# Patient Record
Sex: FEMALE | Race: WHITE | NOT HISPANIC OR LATINO | URBAN - METROPOLITAN AREA
[De-identification: names, ages, dates, MRNs, and addresses within clinical notes are randomized per-mention and may not be internally consistent; named-entity substitution may affect disease eponyms.]

---

## 2017-01-24 PROBLEM — Z00.00 ENCOUNTER FOR PREVENTIVE HEALTH EXAMINATION: Status: ACTIVE | Noted: 2017-01-24

## 2022-12-28 ENCOUNTER — INPATIENT (INPATIENT)
Facility: HOSPITAL | Age: 77
LOS: 1 days | Discharge: ANOTHER HOSPITAL | DRG: 305 | End: 2022-12-30
Attending: INTERNAL MEDICINE | Admitting: INTERNAL MEDICINE
Payer: COMMERCIAL

## 2022-12-28 VITALS
HEIGHT: 63 IN | RESPIRATION RATE: 18 BRPM | SYSTOLIC BLOOD PRESSURE: 209 MMHG | OXYGEN SATURATION: 97 % | HEART RATE: 65 BPM | WEIGHT: 199.96 LBS | TEMPERATURE: 98 F | DIASTOLIC BLOOD PRESSURE: 96 MMHG

## 2022-12-28 DIAGNOSIS — R29.898 OTHER SYMPTOMS AND SIGNS INVOLVING THE MUSCULOSKELETAL SYSTEM: ICD-10-CM

## 2022-12-28 LAB
ALBUMIN SERPL ELPH-MCNC: 3.2 G/DL — LOW (ref 3.5–5)
ALP SERPL-CCNC: 96 U/L — SIGNIFICANT CHANGE UP (ref 40–120)
ALT FLD-CCNC: 18 U/L DA — SIGNIFICANT CHANGE UP (ref 10–60)
ANION GAP SERPL CALC-SCNC: 7 MMOL/L — SIGNIFICANT CHANGE UP (ref 5–17)
AST SERPL-CCNC: 15 U/L — SIGNIFICANT CHANGE UP (ref 10–40)
BASOPHILS # BLD AUTO: 0.05 K/UL — SIGNIFICANT CHANGE UP (ref 0–0.2)
BASOPHILS NFR BLD AUTO: 0.5 % — SIGNIFICANT CHANGE UP (ref 0–2)
BILIRUB SERPL-MCNC: 0.2 MG/DL — SIGNIFICANT CHANGE UP (ref 0.2–1.2)
BUN SERPL-MCNC: 28 MG/DL — HIGH (ref 7–18)
CALCIUM SERPL-MCNC: 9.5 MG/DL — SIGNIFICANT CHANGE UP (ref 8.4–10.5)
CHLORIDE SERPL-SCNC: 103 MMOL/L — SIGNIFICANT CHANGE UP (ref 96–108)
CO2 SERPL-SCNC: 27 MMOL/L — SIGNIFICANT CHANGE UP (ref 22–31)
CREAT SERPL-MCNC: 1 MG/DL — SIGNIFICANT CHANGE UP (ref 0.5–1.3)
EGFR: 57 ML/MIN/1.73M2 — LOW
EOSINOPHIL # BLD AUTO: 0.58 K/UL — HIGH (ref 0–0.5)
EOSINOPHIL NFR BLD AUTO: 6.3 % — HIGH (ref 0–6)
GLUCOSE SERPL-MCNC: 158 MG/DL — HIGH (ref 70–99)
HCT VFR BLD CALC: 32.3 % — LOW (ref 34.5–45)
HGB BLD-MCNC: 10.3 G/DL — LOW (ref 11.5–15.5)
IMM GRANULOCYTES NFR BLD AUTO: 0.3 % — SIGNIFICANT CHANGE UP (ref 0–0.9)
LYMPHOCYTES # BLD AUTO: 1.95 K/UL — SIGNIFICANT CHANGE UP (ref 1–3.3)
LYMPHOCYTES # BLD AUTO: 21 % — SIGNIFICANT CHANGE UP (ref 13–44)
MAGNESIUM SERPL-MCNC: 2 MG/DL — SIGNIFICANT CHANGE UP (ref 1.6–2.6)
MCHC RBC-ENTMCNC: 24.5 PG — LOW (ref 27–34)
MCHC RBC-ENTMCNC: 31.9 GM/DL — LOW (ref 32–36)
MCV RBC AUTO: 76.9 FL — LOW (ref 80–100)
MONOCYTES # BLD AUTO: 0.6 K/UL — SIGNIFICANT CHANGE UP (ref 0–0.9)
MONOCYTES NFR BLD AUTO: 6.5 % — SIGNIFICANT CHANGE UP (ref 2–14)
NEUTROPHILS # BLD AUTO: 6.07 K/UL — SIGNIFICANT CHANGE UP (ref 1.8–7.4)
NEUTROPHILS NFR BLD AUTO: 65.4 % — SIGNIFICANT CHANGE UP (ref 43–77)
NRBC # BLD: 0 /100 WBCS — SIGNIFICANT CHANGE UP (ref 0–0)
PLATELET # BLD AUTO: 302 K/UL — SIGNIFICANT CHANGE UP (ref 150–400)
POTASSIUM SERPL-MCNC: 4.5 MMOL/L — SIGNIFICANT CHANGE UP (ref 3.5–5.3)
POTASSIUM SERPL-SCNC: 4.5 MMOL/L — SIGNIFICANT CHANGE UP (ref 3.5–5.3)
PROT SERPL-MCNC: 7.4 G/DL — SIGNIFICANT CHANGE UP (ref 6–8.3)
RBC # BLD: 4.2 M/UL — SIGNIFICANT CHANGE UP (ref 3.8–5.2)
RBC # FLD: 14.8 % — HIGH (ref 10.3–14.5)
SARS-COV-2 RNA SPEC QL NAA+PROBE: SIGNIFICANT CHANGE UP
SODIUM SERPL-SCNC: 137 MMOL/L — SIGNIFICANT CHANGE UP (ref 135–145)
TROPONIN I, HIGH SENSITIVITY RESULT: 7.6 NG/L — SIGNIFICANT CHANGE UP
WBC # BLD: 9.28 K/UL — SIGNIFICANT CHANGE UP (ref 3.8–10.5)
WBC # FLD AUTO: 9.28 K/UL — SIGNIFICANT CHANGE UP (ref 3.8–10.5)

## 2022-12-28 PROCEDURE — 71045 X-RAY EXAM CHEST 1 VIEW: CPT | Mod: 26

## 2022-12-28 PROCEDURE — 99223 1ST HOSP IP/OBS HIGH 75: CPT | Mod: GC

## 2022-12-28 PROCEDURE — 72192 CT PELVIS W/O DYE: CPT | Mod: 26,MA

## 2022-12-28 PROCEDURE — 70450 CT HEAD/BRAIN W/O DYE: CPT | Mod: 26,MA

## 2022-12-28 PROCEDURE — 99285 EMERGENCY DEPT VISIT HI MDM: CPT

## 2022-12-28 RX ORDER — KETOROLAC TROMETHAMINE 30 MG/ML
15 SYRINGE (ML) INJECTION ONCE
Refills: 0 | Status: DISCONTINUED | OUTPATIENT
Start: 2022-12-28 | End: 2022-12-28

## 2022-12-28 RX ORDER — ENOXAPARIN SODIUM 100 MG/ML
40 INJECTION SUBCUTANEOUS EVERY 24 HOURS
Refills: 0 | Status: DISCONTINUED | OUTPATIENT
Start: 2022-12-28 | End: 2022-12-30

## 2022-12-28 RX ORDER — ACETAMINOPHEN 500 MG
975 TABLET ORAL ONCE
Refills: 0 | Status: DISCONTINUED | OUTPATIENT
Start: 2022-12-28 | End: 2022-12-28

## 2022-12-28 RX ORDER — ACETAMINOPHEN 500 MG
650 TABLET ORAL EVERY 6 HOURS
Refills: 0 | Status: DISCONTINUED | OUTPATIENT
Start: 2022-12-28 | End: 2022-12-30

## 2022-12-28 RX ORDER — METOCLOPRAMIDE HCL 10 MG
10 TABLET ORAL ONCE
Refills: 0 | Status: DISCONTINUED | OUTPATIENT
Start: 2022-12-28 | End: 2022-12-28

## 2022-12-28 RX ADMIN — Medication 15 MILLIGRAM(S): at 20:43

## 2022-12-28 RX ADMIN — Medication 15 MILLIGRAM(S): at 22:28

## 2022-12-28 NOTE — H&P ADULT - PROBLEM SELECTOR PLAN 5
Pt with history of HTN, takes Losartan-HCTZ 100-12.5 mg at home    C/w Losartan 100mg with holding parameters

## 2022-12-28 NOTE — H&P ADULT - PROBLEM SELECTOR PLAN 4
Pt with history of hypothyroidism, takes Levothyroxine 75 mg at home (HHA unsure of dose please confirm with pharmacy in the AM, started dose from [facility])    C/w Levothyroxine 75 mg daily

## 2022-12-28 NOTE — ED ADULT NURSE NOTE - NSIMPLEMENTINTERV_GEN_ALL_ED
Implemented All Fall Risk Interventions:  [place] to call system. Call bell, personal items and telephone within reach. Instruct patient to call for assistance. Room bathroom lighting operational. Non-slip footwear when patient is off stretcher. Physically safe environment: no spills, clutter or unnecessary equipment. Stretcher in lowest position, wheels locked, appropriate side rails in place. Provide visual cue, wrist band, yellow gown, etc. Monitor gait and stability. Monitor for mental status changes and reorient to person, place, and time. Review medications for side effects contributing to fall risk. Reinforce activity limits and safety measures with patient and family.

## 2022-12-28 NOTE — H&P ADULT - HISTORY OF PRESENT ILLNESS
This is a  76 yo F from home (HHA7d/10hr) with pmhx  HTN, HLD, DM on metformin, hypothyroidism, depression, and sciatica who presents with b/l lower leg weakness. Pt states weakness began suddenly one week ago. She has been unable to get out of bed and walk at home. Pt states she has associated numbness, tingling, and pain 7/10 constant in her posterior thighs and lower legs. Pt took acetaminophen at home for pain which helped. Pt also endorses urinary incontinence and stool incontinence. Pt states she has chronic dysuria. Pt denies fever, chills, headache, CP, SOB, N/V/D, or constipation.    ED Vitals: HR 61, /71, T 98.1, RR 18, SpO2 98% RA  ED meds: Metoclopramide, Ketoralac, Tylenol PO This is a  78 yo F from home (HHA7d/10hr) with pmhx  HTN, HLD, DM on metformin, hypothyroidism, depression, and sciatica who presents with b/l lower leg weakness. Pt states weakness began suddenly one week ago. She has been unable to get out of bed and walk at home. Pt states she has associated numbness, tingling, and pain 7/10 constant in her posterior thighs and lower legs. Pt took acetaminophen at home for pain which helped. Pt also endorses urinary incontinence and stool incontinence. Pt states she has chronic dysuria. Pt denies fever, chills, headache, CP, SOB, N/V/D, or constipation.    ED Vitals: HR 61, /71, T 98.1, RR 18, SpO2 98% RA  ED meds: Metoclopramide, Ketoralac, Tylenol PO

## 2022-12-28 NOTE — H&P ADULT - NSHPPHYSICALEXAM_GEN_ALL_CORE
GENERAL: Mild distress, laying in bed, obese  HEAD:  Atraumatic, Normocephalic  EYES: EOMI, PERRLA, conjunctiva and sclera clear  NECK: Supple  CHEST/LUNG: Clear to auscultation bilaterally, no RRW  HEART: Regular rate and rhythm; No murmurs, rubs, or gallops  ABDOMEN: Soft, Nontender, Nondistended; Bowel sounds present  EXTREMITIES:  2+ Peripheral Pulses, No edema  PSYCH: AAOx3  NEUROLOGY: Weakness LE B/L 3/5 strength on flexion and extension full ROM, unable to wiggle toes. Decreased sensation b/l to regular touch up to ankle, Upper extremity 5/5 strength and full ROM. F2N test negative, shin to heel unable to perform. No pronator drift. unable to hold legs up for more than 2 seconds.   SKIN: abdominal scar healed GENERAL: Mild distress, laying in bed, obese  HEAD:  Atraumatic, Normocephalic  EYES: EOMI, PERRLA, conjunctiva and sclera clear  NECK: Supple  CHEST/LUNG: Clear to auscultation bilaterally, no RRW  HEART: Regular rate and rhythm; No murmurs, rubs, or gallops  ABDOMEN: Soft, Nontender, Nondistended; Bowel sounds present  EXTREMITIES:  2+ Peripheral Pulses, No edema  PSYCH: AAOx3  NEUROLOGY: Weakness LE B/L 3/5 strength on flexion and extension full ROM, unable to wiggle toes. Decreased sensation b/l to regular touch up to ankle, Upper extremity 5/5 strength and full ROM. F2N test negative, shin to heel unable to perform. No pronator drift. unable to hold legs up for more than 2 seconds. Straight leg test positive left side  SKIN: abdominal scar healed

## 2022-12-28 NOTE — H&P ADULT - PROBLEM SELECTOR PLAN 1
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 No acute fracture  Likely sciatica related or related to degenerative changes in lumbar region or psych related     F/u MRI lumbar spine  F/u CT Lumbar spine  PT consult  Neuro Dr. Aguirre consulted

## 2022-12-28 NOTE — ED PROVIDER NOTE - OBJECTIVE STATEMENT
77-year-old female hx of HTN, HLD, presenting with BLE pain/weakness x 1.5 weeks. Can usually ambulate with a walker but lately she has been unable to get out of bed as she has pain when she lifts up her legs. Can flex her hips and knees and lift her legs off the bed but has pain in her pelvis and back of thigh. Other than this, she has no symptoms - no cough, runny nose, fevers, chills, chest pain, SOB. No recent viral illness. No trauma or head injury. No other symptoms.

## 2022-12-28 NOTE — ED PROVIDER NOTE - CLINICAL SUMMARY MEDICAL DECISION MAKING FREE TEXT BOX
77-year-old female hx of HTN, HLD, presenting with BLE pain/weakness x 1.5 weeks. No back pain. Labs and CTH/pelvis wnl. Patient unable to walk. Will admit.

## 2022-12-28 NOTE — H&P ADULT - NSICDXPASTMEDICALHX_GEN_ALL_CORE_FT
PAST MEDICAL HISTORY:  DM (diabetes mellitus)     HLD (hyperlipidemia)     HTN (hypertension)     Hypothyroidism     Major depression     Sciatica

## 2022-12-28 NOTE — H&P ADULT - ATTENDING COMMENTS
Vital Signs Last 24 Hrs  T(C): 36.7 (28 Dec 2022 21:07), Max: 36.7 (28 Dec 2022 21:07)  T(F): 98.1 (28 Dec 2022 21:07), Max: 98.1 (28 Dec 2022 21:07)  HR: 61 (28 Dec 2022 21:07) (61 - 65)  BP: 138/71 (28 Dec 2022 21:07) (138/71 - 209/96)  BP(mean): --  RR: 18 (28 Dec 2022 21:07) (18 - 18)  SpO2: 98% (28 Dec 2022 21:07) (97% - 98%)  Parameters below as of 28 Dec 2022 21:07  Patient On (Oxygen Delivery Method): room air    Labs and imaging studies noted    CT head: No acute intracranial bleeding. Posterior periventricular white matter chronic microvascular ischemic change.  CT pelvis: No acute fracture.    Assessment and plan: Vital Signs Last 24 Hrs  T(C): 36.7 (28 Dec 2022 21:07), Max: 36.7 (28 Dec 2022 21:07)  T(F): 98.1 (28 Dec 2022 21:07), Max: 98.1 (28 Dec 2022 21:07)  HR: 61 (28 Dec 2022 21:07) (61 - 65)  BP: 138/71 (28 Dec 2022 21:07) (138/71 - 209/96)  BP(mean): --  RR: 18 (28 Dec 2022 21:07) (18 - 18)  SpO2: 98% (28 Dec 2022 21:07) (97% - 98%)  Parameters below as of 28 Dec 2022 21:07  Patient On (Oxygen Delivery Method): room air  Labs and imaging studies noted    CT head: No acute intracranial bleeding. Posterior periventricular white matter chronic microvascular ischemic change.  CT pelvis: No acute fracture.    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 Pt states weakness began suddenly one week ago. She has been unable to get out of bed and walk at home. Pt states she has associated numbness, tingling, and pain 7/10 constant in her posterior thighs and lower legs. Pt took acetaminophen at home for pain which helped. Pt also endorses urinary incontinence and stool incontinence. Pt states she has chronic dysuria. Pt denies fever, chills, headache, CP, SOB, N/V/D, or constipation.    Vital Signs Last 24 Hrs  T(C): 36.7 (28 Dec 2022 21:07), Max: 36.7 (28 Dec 2022 21:07)  T(F): 98.1 (28 Dec 2022 21:07), Max: 98.1 (28 Dec 2022 21:07)  HR: 61 (28 Dec 2022 21:07) (61 - 65)  BP: 138/71 (28 Dec 2022 21:07) (138/71 - 209/96)  BP(mean): --  RR: 18 (28 Dec 2022 21:07) (18 - 18)  SpO2: 98% (28 Dec 2022 21:07) (97% - 98%)  Parameters below as of 28 Dec 2022 21:07  Patient On (Oxygen Delivery Method): room air  Labs and imaging studies noted  LE B/L 3/5 full ROM, Decreased sensation b/l to regular touch up to ankle, L SLR test +, Upper extremity 5/5 motor strength, intact sensation    CT head: No acute intracranial bleeding. Posterior periventricular white matter chronic microvascular ischemic change.  CT pelvis: No acute fracture.  CT lumbar spine pending    Assessment and plan:76 yo F from home (HHA7d/10hr) with PMH of  HTN, HLD, Type 2 DM on metformin, hypothyroidism, depression, and sciatica who presents with b/l lower leg weakness x 1 week, admitted for ambulatory dysfunction admitted for further workup.     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 Aguirre  - SSI  - resume home meds as discussed above  - s/c Lovenox DVT ppx

## 2022-12-28 NOTE — ED PROVIDER NOTE - NEUROLOGICAL, MLM
Alert and oriented, able to flex hip/knee and lift leg partially off the bed but drifts down and hits bed (bilaterally(, no sensory deficits.

## 2022-12-28 NOTE — ED ADULT NURSE NOTE - ED STAT RN HANDOFF DETAILS
Patient A&Ox3 ADMITTED TO MEDICINE FOR BLE WEAKNESS. IV intact, no redness or swelling noted. Vital signs stable as documented no acute distress noted Endorsed to Raman SUAREZ. Patient A&Ox3 ADMITTED TO MEDICINE FOR BLE WEAKNESS. IV intact, no redness or swelling noted. Vital signs stable as documented no acute distress noted Endorsed to Jannie SUAREZ.

## 2022-12-28 NOTE — H&P ADULT - ASSESSMENT
This is a  76 yo F from home (HHA7d/10hr) with pmhx  HTN, HLD, DM on metformin, hypothyroidism, depression, and sciatica who presents with b/l lower leg weakness. Admitted for ambulatory dysfunction. This is a  76 yo F from home (HHA7d/10hr) with pmhx  HTN, HLD, DM on metformin, hypothyroidism, depression, and sciatica who presents with b/l lower leg weakness. Admitted for ambulatory dysfunction.    MED REC not complete please confirm with listed pharmacy in AM. Spoke to A who does not have list with her and pt does not remember. Meds per surescript for now

## 2022-12-28 NOTE — H&P ADULT - NSHPREVIEWOFSYSTEMS_GEN_ALL_CORE
CONSTITUTIONAL: No fever, weight loss, or fatigue  RESPIRATORY: No cough, wheezing, chills or hemoptysis; No shortness of breath  CARDIOVASCULAR: No chest pain, palpitations, dizziness, or leg swelling  GASTROINTESTINAL: No abdominal pain. No nausea, vomiting, or hematemesis; No diarrhea or constipation. No melena or hematochezia.  GENITOURINARY: No hematuria, urinary frequency  + dysuria  NEUROLOGICAL: No headaches, memory loss. + Loss of strength  and numbness  ENDOCRINE: No polyuria, polydipsia, or heat/cold intolerance  MUSKULOSKELETAL: No muscle aches, joint pains  HEME: no easy bruisability, no tender or enlarged lymph nodes  SKIN: No itching, burning, rashes, or lesions .

## 2022-12-28 NOTE — H&P ADULT - PROBLEM SELECTOR PLAN 2
Pt with history of left sided sciatica, takes Meloxicam (unsure dose) at home     C/w Tylenol PRN for pain

## 2022-12-28 NOTE — H&P ADULT - PROBLEM SELECTOR PLAN 3
Pt with history of DM, takes metformin 850 BID at home    C/w ISS while in the hospital with goal 140-180 FS

## 2022-12-29 DIAGNOSIS — E03.9 HYPOTHYROIDISM, UNSPECIFIED: ICD-10-CM

## 2022-12-29 DIAGNOSIS — E78.5 HYPERLIPIDEMIA, UNSPECIFIED: ICD-10-CM

## 2022-12-29 DIAGNOSIS — I10 ESSENTIAL (PRIMARY) HYPERTENSION: ICD-10-CM

## 2022-12-29 DIAGNOSIS — Z29.9 ENCOUNTER FOR PROPHYLACTIC MEASURES, UNSPECIFIED: ICD-10-CM

## 2022-12-29 DIAGNOSIS — E11.9 TYPE 2 DIABETES MELLITUS WITHOUT COMPLICATIONS: ICD-10-CM

## 2022-12-29 DIAGNOSIS — M54.30 SCIATICA, UNSPECIFIED SIDE: ICD-10-CM

## 2022-12-29 DIAGNOSIS — F32.9 MAJOR DEPRESSIVE DISORDER, SINGLE EPISODE, UNSPECIFIED: ICD-10-CM

## 2022-12-29 DIAGNOSIS — R29.898 OTHER SYMPTOMS AND SIGNS INVOLVING THE MUSCULOSKELETAL SYSTEM: ICD-10-CM

## 2022-12-29 LAB
ALBUMIN SERPL ELPH-MCNC: 3.6 G/DL — SIGNIFICANT CHANGE UP (ref 3.5–5)
ALP SERPL-CCNC: 92 U/L — SIGNIFICANT CHANGE UP (ref 40–120)
ALT FLD-CCNC: 16 U/L DA — SIGNIFICANT CHANGE UP (ref 10–60)
ANION GAP SERPL CALC-SCNC: 9 MMOL/L — SIGNIFICANT CHANGE UP (ref 5–17)
AST SERPL-CCNC: 14 U/L — SIGNIFICANT CHANGE UP (ref 10–40)
BASOPHILS # BLD AUTO: 0.05 K/UL — SIGNIFICANT CHANGE UP (ref 0–0.2)
BASOPHILS NFR BLD AUTO: 0.5 % — SIGNIFICANT CHANGE UP (ref 0–2)
BILIRUB SERPL-MCNC: 0.2 MG/DL — SIGNIFICANT CHANGE UP (ref 0.2–1.2)
BUN SERPL-MCNC: 32 MG/DL — HIGH (ref 7–18)
CALCIUM SERPL-MCNC: 10 MG/DL — SIGNIFICANT CHANGE UP (ref 8.4–10.5)
CHLORIDE SERPL-SCNC: 102 MMOL/L — SIGNIFICANT CHANGE UP (ref 96–108)
CO2 SERPL-SCNC: 27 MMOL/L — SIGNIFICANT CHANGE UP (ref 22–31)
CREAT SERPL-MCNC: 1.17 MG/DL — SIGNIFICANT CHANGE UP (ref 0.5–1.3)
EGFR: 47 ML/MIN/1.73M2 — LOW
EOSINOPHIL # BLD AUTO: 0.61 K/UL — HIGH (ref 0–0.5)
EOSINOPHIL NFR BLD AUTO: 6.6 % — HIGH (ref 0–6)
GLUCOSE BLDC GLUCOMTR-MCNC: 110 MG/DL — HIGH (ref 70–99)
GLUCOSE BLDC GLUCOMTR-MCNC: 117 MG/DL — HIGH (ref 70–99)
GLUCOSE BLDC GLUCOMTR-MCNC: 166 MG/DL — HIGH (ref 70–99)
GLUCOSE BLDC GLUCOMTR-MCNC: 167 MG/DL — HIGH (ref 70–99)
GLUCOSE SERPL-MCNC: 124 MG/DL — HIGH (ref 70–99)
HCT VFR BLD CALC: 31.2 % — LOW (ref 34.5–45)
HGB BLD-MCNC: 9.7 G/DL — LOW (ref 11.5–15.5)
IMM GRANULOCYTES NFR BLD AUTO: 0.2 % — SIGNIFICANT CHANGE UP (ref 0–0.9)
LYMPHOCYTES # BLD AUTO: 2.74 K/UL — SIGNIFICANT CHANGE UP (ref 1–3.3)
LYMPHOCYTES # BLD AUTO: 29.7 % — SIGNIFICANT CHANGE UP (ref 13–44)
MAGNESIUM SERPL-MCNC: 2 MG/DL — SIGNIFICANT CHANGE UP (ref 1.6–2.6)
MCHC RBC-ENTMCNC: 23.8 PG — LOW (ref 27–34)
MCHC RBC-ENTMCNC: 31.1 GM/DL — LOW (ref 32–36)
MCV RBC AUTO: 76.7 FL — LOW (ref 80–100)
MONOCYTES # BLD AUTO: 0.64 K/UL — SIGNIFICANT CHANGE UP (ref 0–0.9)
MONOCYTES NFR BLD AUTO: 6.9 % — SIGNIFICANT CHANGE UP (ref 2–14)
NEUTROPHILS # BLD AUTO: 5.18 K/UL — SIGNIFICANT CHANGE UP (ref 1.8–7.4)
NEUTROPHILS NFR BLD AUTO: 56.1 % — SIGNIFICANT CHANGE UP (ref 43–77)
NRBC # BLD: 0 /100 WBCS — SIGNIFICANT CHANGE UP (ref 0–0)
PHOSPHATE SERPL-MCNC: 4.4 MG/DL — SIGNIFICANT CHANGE UP (ref 2.5–4.5)
PLATELET # BLD AUTO: 320 K/UL — SIGNIFICANT CHANGE UP (ref 150–400)
POTASSIUM SERPL-MCNC: 4.8 MMOL/L — SIGNIFICANT CHANGE UP (ref 3.5–5.3)
POTASSIUM SERPL-SCNC: 4.8 MMOL/L — SIGNIFICANT CHANGE UP (ref 3.5–5.3)
PROT SERPL-MCNC: 7.3 G/DL — SIGNIFICANT CHANGE UP (ref 6–8.3)
RBC # BLD: 4.07 M/UL — SIGNIFICANT CHANGE UP (ref 3.8–5.2)
RBC # FLD: 15.1 % — HIGH (ref 10.3–14.5)
SODIUM SERPL-SCNC: 138 MMOL/L — SIGNIFICANT CHANGE UP (ref 135–145)
TSH SERPL-MCNC: 5.27 UU/ML — HIGH (ref 0.34–4.82)
WBC # BLD: 9.24 K/UL — SIGNIFICANT CHANGE UP (ref 3.8–10.5)
WBC # FLD AUTO: 9.24 K/UL — SIGNIFICANT CHANGE UP (ref 3.8–10.5)

## 2022-12-29 PROCEDURE — 72157 MRI CHEST SPINE W/O & W/DYE: CPT | Mod: 26

## 2022-12-29 PROCEDURE — 72158 MRI LUMBAR SPINE W/O & W/DYE: CPT | Mod: 26

## 2022-12-29 PROCEDURE — 99223 1ST HOSP IP/OBS HIGH 75: CPT

## 2022-12-29 PROCEDURE — 99233 SBSQ HOSP IP/OBS HIGH 50: CPT | Mod: GC

## 2022-12-29 PROCEDURE — 72156 MRI NECK SPINE W/O & W/DYE: CPT | Mod: 26

## 2022-12-29 RX ORDER — LOSARTAN/HYDROCHLOROTHIAZIDE 100MG-25MG
1 TABLET ORAL
Qty: 0 | Refills: 0 | DISCHARGE

## 2022-12-29 RX ORDER — INSULIN LISPRO 100/ML
VIAL (ML) SUBCUTANEOUS
Refills: 0 | Status: DISCONTINUED | OUTPATIENT
Start: 2022-12-29 | End: 2022-12-30

## 2022-12-29 RX ORDER — METFORMIN HYDROCHLORIDE 850 MG/1
1 TABLET ORAL
Qty: 0 | Refills: 0 | DISCHARGE

## 2022-12-29 RX ORDER — GABAPENTIN 400 MG/1
400 CAPSULE ORAL THREE TIMES A DAY
Refills: 0 | Status: DISCONTINUED | OUTPATIENT
Start: 2022-12-29 | End: 2022-12-30

## 2022-12-29 RX ORDER — FLUOXETINE HCL 10 MG
20 CAPSULE ORAL DAILY
Refills: 0 | Status: DISCONTINUED | OUTPATIENT
Start: 2022-12-29 | End: 2022-12-30

## 2022-12-29 RX ORDER — SUMATRIPTAN SUCCINATE 4 MG/.5ML
1 INJECTION, SOLUTION SUBCUTANEOUS
Qty: 0 | Refills: 0 | DISCHARGE

## 2022-12-29 RX ORDER — FLUOXETINE HCL 10 MG
1 CAPSULE ORAL
Qty: 0 | Refills: 0 | DISCHARGE

## 2022-12-29 RX ORDER — BACLOFEN 100 %
1 POWDER (GRAM) MISCELLANEOUS
Qty: 0 | Refills: 0 | DISCHARGE

## 2022-12-29 RX ORDER — SIMVASTATIN 20 MG/1
20 TABLET, FILM COATED ORAL AT BEDTIME
Refills: 0 | Status: DISCONTINUED | OUTPATIENT
Start: 2022-12-29 | End: 2022-12-30

## 2022-12-29 RX ORDER — LEVOTHYROXINE SODIUM 125 MCG
1 TABLET ORAL
Qty: 0 | Refills: 0 | DISCHARGE

## 2022-12-29 RX ORDER — GABAPENTIN 400 MG/1
1 CAPSULE ORAL
Qty: 0 | Refills: 0 | DISCHARGE

## 2022-12-29 RX ORDER — PANTOPRAZOLE SODIUM 20 MG/1
40 TABLET, DELAYED RELEASE ORAL
Refills: 0 | Status: DISCONTINUED | OUTPATIENT
Start: 2022-12-29 | End: 2022-12-30

## 2022-12-29 RX ORDER — ASPIRIN/CALCIUM CARB/MAGNESIUM 324 MG
81 TABLET ORAL DAILY
Refills: 0 | Status: DISCONTINUED | OUTPATIENT
Start: 2022-12-29 | End: 2022-12-30

## 2022-12-29 RX ORDER — INSULIN LISPRO 100/ML
VIAL (ML) SUBCUTANEOUS AT BEDTIME
Refills: 0 | Status: DISCONTINUED | OUTPATIENT
Start: 2022-12-29 | End: 2022-12-30

## 2022-12-29 RX ORDER — LOSARTAN POTASSIUM 100 MG/1
100 TABLET, FILM COATED ORAL DAILY
Refills: 0 | Status: DISCONTINUED | OUTPATIENT
Start: 2022-12-29 | End: 2022-12-30

## 2022-12-29 RX ORDER — ASPIRIN/CALCIUM CARB/MAGNESIUM 324 MG
1 TABLET ORAL
Qty: 0 | Refills: 0 | DISCHARGE

## 2022-12-29 RX ORDER — LANOLIN ALCOHOL/MO/W.PET/CERES
5 CREAM (GRAM) TOPICAL AT BEDTIME
Refills: 0 | Status: DISCONTINUED | OUTPATIENT
Start: 2022-12-29 | End: 2022-12-30

## 2022-12-29 RX ORDER — CYCLOBENZAPRINE HYDROCHLORIDE 10 MG/1
5 TABLET, FILM COATED ORAL ONCE
Refills: 0 | Status: COMPLETED | OUTPATIENT
Start: 2022-12-29 | End: 2022-12-29

## 2022-12-29 RX ORDER — SIMVASTATIN 20 MG/1
1 TABLET, FILM COATED ORAL
Qty: 0 | Refills: 0 | DISCHARGE

## 2022-12-29 RX ORDER — LEVOTHYROXINE SODIUM 125 MCG
75 TABLET ORAL DAILY
Refills: 0 | Status: DISCONTINUED | OUTPATIENT
Start: 2022-12-29 | End: 2022-12-30

## 2022-12-29 RX ORDER — DEXAMETHASONE 0.5 MG/5ML
10 ELIXIR ORAL ONCE
Refills: 0 | Status: DISCONTINUED | OUTPATIENT
Start: 2022-12-29 | End: 2022-12-29

## 2022-12-29 RX ADMIN — Medication 20 MILLIGRAM(S): at 14:55

## 2022-12-29 RX ADMIN — ENOXAPARIN SODIUM 40 MILLIGRAM(S): 100 INJECTION SUBCUTANEOUS at 05:39

## 2022-12-29 RX ADMIN — Medication 650 MILLIGRAM(S): at 04:55

## 2022-12-29 RX ADMIN — Medication 5 MILLIGRAM(S): at 22:22

## 2022-12-29 RX ADMIN — Medication 650 MILLIGRAM(S): at 16:51

## 2022-12-29 RX ADMIN — GABAPENTIN 400 MILLIGRAM(S): 400 CAPSULE ORAL at 05:38

## 2022-12-29 RX ADMIN — Medication 650 MILLIGRAM(S): at 05:42

## 2022-12-29 RX ADMIN — PANTOPRAZOLE SODIUM 40 MILLIGRAM(S): 20 TABLET, DELAYED RELEASE ORAL at 05:38

## 2022-12-29 RX ADMIN — Medication 81 MILLIGRAM(S): at 14:55

## 2022-12-29 RX ADMIN — CYCLOBENZAPRINE HYDROCHLORIDE 5 MILLIGRAM(S): 10 TABLET, FILM COATED ORAL at 18:49

## 2022-12-29 RX ADMIN — Medication 650 MILLIGRAM(S): at 17:58

## 2022-12-29 RX ADMIN — Medication 75 MICROGRAM(S): at 05:38

## 2022-12-29 RX ADMIN — GABAPENTIN 400 MILLIGRAM(S): 400 CAPSULE ORAL at 14:56

## 2022-12-29 RX ADMIN — Medication 1: at 18:31

## 2022-12-29 RX ADMIN — LOSARTAN POTASSIUM 100 MILLIGRAM(S): 100 TABLET, FILM COATED ORAL at 05:38

## 2022-12-29 NOTE — PROGRESS NOTE ADULT - PROBLEM SELECTOR PLAN 4
Pt with history of hypothyroidism, takes Levothyroxine 75 mg at home (HHA unsure of dose please confirm with pharmacy in the AM, started dose from [facility])    C/w Levothyroxine 75 mg daily - Pt with history of hypothyroidism, takes Levothyroxine 75 mg at home (HHA unsure of dose please confirm with pharmacy in the AM, [facility]    - C/w Levothyroxine 75 mg daily - Pt with history of hypothyroidism, takes Levothyroxine 75 mg at home     - C/w Levothyroxine 75 mg daily

## 2022-12-29 NOTE — PROGRESS NOTE ADULT - ATTENDING COMMENTS
Patient seen/evaluated at bedside on 12/30/22. I agree with the resident progress note/outlined plan of care. My independent findings and conclusions are documented.    S: reports fall 2 weeks ago, bilateral lower extremity weakness one week ago. Reports several days of bowel bladder incontinence    physical exam significant for alert/oriented  Lower extremity :strength 3/5 full ROM, Decreased sensation b/l to regular touch up to ankle, L SLR test +  Upper extremity 5/5 motor strength, intact sensation    78 yo F from home (HHA7d/10hr) with PMH of  HTN, HLD, Type 2 DM on metformin, hypothyroidism, depression, and sciatica who presents with b/l lower leg weakness x 1 week, admitted for ambulatory dysfunction admitted for further workup.     1. B/l lower extremity weakness- concern for cauda equina, cord compression  2. ambulatory dysfunction  3. sciatica  4.  Type 2 DM Metformin  5. HTN  6. HLD  7. Hypothyroidism    fall precautions  MRI cervical, thoracic lumbar- neurosurgery vs. ortho spine service pending results  discussed with neurology who did not feel findings were supportive of cord compression and based on timing of reported symptom onset and presentation strongly recommended deferring steroid dosing

## 2022-12-29 NOTE — PROGRESS NOTE ADULT - PROBLEM SELECTOR PLAN 5
Pt with history of HTN, takes Losartan-HCTZ 100-12.5 mg at home    C/w Losartan 100mg with holding parameters - Pt with history of HTN, takes Losartan-HCTZ 100-12.5 mg at home    - C/w Losartan 100mg with holding parameters

## 2022-12-29 NOTE — PATIENT PROFILE ADULT - FALL HARM RISK - HARM RISK INTERVENTIONS
Assistance with ambulation/Assistance OOB with selected safe patient handling equipment/Communicate Risk of Fall with Harm to all staff/Discuss with provider need for PT consult/Monitor gait and stability/Reinforce activity limits and safety measures with patient and family/Tailored Fall Risk Interventions/Visual Cue: Yellow wristband and red socks/Bed in lowest position, wheels locked, appropriate side rails in place/Call bell, personal items and telephone in reach/Instruct patient to call for assistance before getting out of bed or chair/Non-slip footwear when patient is out of bed/[place] to call system/Physically safe environment - no spills, clutter or unnecessary equipment/Purposeful Proactive Rounding/Room/bathroom lighting operational, light cord in reach

## 2022-12-29 NOTE — PATIENT PROFILE ADULT - NSTRANSFERBELONGINGSRESP_GEN_A_NUR
Feels better with less abd pain  Afeb VS stable In RR Clear chest Abd is non distended with BS and mild tenderness yes

## 2022-12-29 NOTE — CONSULT NOTE ADULT - SUBJECTIVE AND OBJECTIVE BOX
To be completed      Two weeks ago fell on her R ankle.  Has not walked since then, and has been confined to bed.      She is aware of needing to urinate/defecate.  She has not been able to get to a bathroom or use a bed pan in time.  Once she starts urinating she cannot control it.  Also cannot control defecation once it starts.        EXAMINATION    Awake, alert.  Grossly intact comprehension, expression, articulation, prosody.  PERRL; EOMI; cataracts OU; confrontation visual fields grossly intact.  Normal facial/lingual movements.  Completely edentulous; has removable maxillary prosthesis in place.      No UE drift.  Guarding of UE motor effort due to multiple painful joints, worse on the L side (especially due to L shoulder pain).  No grossly demonstrable focal/lateralized deficits, but guarding precludes detecting relatively minor weakness.      LE motor exam (recumbent): does not wiggle toes or move at ankles.  Just manages partial internal/external rotation at the hips.  With examiner supporting either thigh in 45 degrees of flexion she can partially extend ~ 20 degrees at either knee, and momentarily hold against minimal resistance (better on the L side).  Spontaneous partial flexion at the L hip (dragging the heel) is observed.     P and LT sensation grossly intact face, UEs, torso (including buttocks), both LEs down to ~ the foot/knee on the R/L; subjectively diminished R foot and below the L knee.         Reflex                           Right    Left   Comment    Scapulohumeral               0        0  Pectoralis                         2       1  Biceps                             3-      3-  Triceps                           0?      0?  Hip add                          2       1  Patellar                           3       3  Gastroc                           0       0  Plantar                          mute mute   History from Admission H&P yesterday.    <Start of quote(s) from H&P>  "Reason for Admission: Lower extremity weakness  History of Present Illness:   This is a  78 yo F from home (HHA7d/10hr) with pmhx  HTN, HLD, DM on metformin, hypothyroidism, depression, and sciatica who presents with b/l lower leg weakness. Pt states weakness began suddenly one week ago. She has been unable to get out of bed and walk at home. Pt states she has associated numbness, tingling, and pain 7/10 constant in her posterior thighs and lower legs. Pt took acetaminophen at home for pain which helped. Pt also endorses urinary incontinence and stool incontinence. Pt states she has chronic dysuria. Pt denies fever, chills, headache, CP, SOB, N/V/D, or constipation.    ED Vitals: HR 61, /71, T 98.1, RR 18, SpO2 98% RA  ED meds: Metoclopramide, Ketoralac, Tylenol PO     Review of Systems:  Review of Systems: CONSTITUTIONAL: No fever, weight loss, or fatigue  RESPIRATORY: No cough, wheezing, chills or hemoptysis; No shortness of breath  CARDIOVASCULAR: No chest pain, palpitations, dizziness, or leg swelling  GASTROINTESTINAL: No abdominal pain. No nausea, vomiting, or hematemesis; No diarrhea or constipation. No melena or hematochezia.  GENITOURINARY: No hematuria, urinary frequency  + dysuria  NEUROLOGICAL: No headaches, memory loss. + Loss of strength  and numbness  ENDOCRINE: No polyuria, polydipsia, or heat/cold intolerance  MUSKULOSKELETAL: No muscle aches, joint pains  HEME: no easy bruisability, no tender or enlarged lymph nodes  SKIN: No itching, burning, rashes, or lesions .  Other Review of Systems: All other review of systems negative, except as noted in HPI      Allergies and Intolerances:        Allergies:  	No Known Allergies:     Home Medications:   * Incomplete Medication History as of 29-Dec-2022 01:48 documented in Structured Notes  · 	PROzac 20 mg oral capsule: Last Dose Taken:  , 1 cap(s) orally once a day  · 	gabapentin 400 mg oral capsule: Last Dose Taken:  , 1 cap(s) orally 3 times a day  · 	baclofen 10 mg oral tablet: Last Dose Taken:  , 1 tab(s) orally 3 times a day  · 	omeprazole 20 mg oral delayed release capsule: Last Dose Taken:  , 1 cap(s) orally once a day  · 	meloxicam 5 mg oral capsule: Last Dose Taken:  , 1 cap(s) orally once a day  · 	losartan-hydroCHLOROthiazide 100 mg-12.5 mg oral tablet: Last Dose Taken:  , 1 tab(s) orally once a day  · 	SUMAtriptan 25 mg oral tablet: Last Dose Taken:  , 1 tab(s) orally once, As Needed  · 	simvastatin 20 mg oral tablet: 1 tab(s) orally once a day (at bedtime)  · 	metFORMIN 850 mg oral tablet: 1 tab(s) orally in the morning and at bedtime  · 	aspirin 81 mg oral delayed release tablet: 1 tab(s) orally once a day  · 	levothyroxine 75 mcg (0.075 mg) oral tablet: 1 tab(s) orally once a day      Patient History:    Past Medical, Past Surgical, and Family History:  PAST MEDICAL HISTORY:  DM (diabetes mellitus)   HLD (hyperlipidemia)   HTN (hypertension)   Hypothyroidism   Major depression   Sciatica.     PAST SURGICAL HISTORY:  No significant past surgical history. [Neurology Attending comment: Pt had anterior cervical discectomies several years ago.]   . . .      Social History:  · Substance use	No  · Social History (marital status, living situation, occupation, and sexual history)	HHA 7days a week for 10 hours  Denies EtoH use  Denies tobacco use     Tobacco Screening:  · Core Measure Site	Yes  · Has the patient used tobacco in the past 30 days?	No  "  <End of quote(s) from H&P>    History as above.  On my interviewing th Pt I note in particular or in addition:      She has a home health aide.  Two weeks ago she fell on her R ankle at home.  Subsequently could not raise her LEs off the mattress or get out of bed.  Has not walked since then, confined to bed.      She is aware of needing to urinate/defecate.  She has not been able to get to a bathroom or use a bed pan in time.  Once she starts urinating she cannot control it.  Also cannot control defecation once it starts.        EXAMINATION    Awake, alert.  Grossly intact comprehension, expression, articulation, prosody.  PERRL; EOMI; cataracts OU; confrontation visual fields grossly intact.  Normal facial/lingual movements.  Completely edentulous; has removable maxillary prosthesis in place.      No UE drift.  Guarding of UE motor effort due to multiple painful joints, worse on the L side (especially due to L shoulder pain).  No grossly demonstrable focal/lateralized deficits, but guarding precludes detecting relatively minor weakness.      LE motor exam (recumbent): does not wiggle toes or move at ankles.  Just manages partial internal/external rotation at the hips.  With examiner supporting either thigh in 45 degrees of flexion she can partially extend ~ 20 degrees at either knee, and momentarily hold against minimal resistance (better on the L side).  Spontaneous partial flexion at the L hip (dragging the heel) is observed.     P and LT sensation grossly intact face, UEs, torso (including buttocks), both LEs down to ~ the foot/knee on the R/L; subjectively diminished R foot and below the L knee.         Reflex                           Right    Left   Comment    Scapulohumeral               0        0  Pectoralis                         2       1  Biceps                             3-      3-  Triceps                           0?      0?  Hip add                          2       1  Patellar                           3       3  Gastroc                           0       0  Plantar                          mute mute

## 2022-12-29 NOTE — PROGRESS NOTE ADULT - PROBLEM SELECTOR PLAN 2
Pt with history of left sided sciatica, takes Meloxicam (unsure dose) at home     C/w Tylenol PRN for pain - Pt with history of left sided sciatica, takes Meloxicam 5mg at home     - C/w Tylenol PRN for pain

## 2022-12-29 NOTE — PROGRESS NOTE ADULT - SUBJECTIVE AND OBJECTIVE BOX
PGY-1 Progress Note discussed with attending      PLEASE CONTACT ON CALL TEAM:  - On Call Team (Please refer to Misbah) FROM 5:00 PM - 8:30PM  - Nightfloat Team FROM 8:30 -7:30 AM    INTERVAL HPI/OVERNIGHT EVENTS:     Review of Systems:   CONSTITUTIONAL: No fever, weight loss, or fatigue  RESPIRATORY: No cough, wheezing, chills or hemoptysis; No shortness of breath  CARDIOVASCULAR: No chest pain, palpitations, dizziness, or leg swelling  GASTROINTESTINAL: No abdominal pain. No nausea, vomiting, or ; No diarrhea or constipation  GENITOURINARY: No hematuria, urinary frequency  + dysuria  NEUROLOGICAL: No headaches, memory loss. + Loss of strength  and numbness. Cool sensation to perineal region.   ENDOCRINE: No polyuria, polydipsia, or heat/cold intolerance  MUSKULOSKELETAL: No muscle aches, joint pains  HEME: no easy bruisability, no tender or enlarged lymph nodes  SKIN: No itching, burning, rashes, or lesions .    MEDICATIONS  (STANDING):  aspirin enteric coated 81 milliGRAM(s) Oral daily  enoxaparin Injectable 40 milliGRAM(s) SubCutaneous every 24 hours  FLUoxetine 20 milliGRAM(s) Oral daily  gabapentin 400 milliGRAM(s) Oral three times a day  insulin lispro (ADMELOG) corrective regimen sliding scale   SubCutaneous three times a day before meals  insulin lispro (ADMELOG) corrective regimen sliding scale   SubCutaneous at bedtime  levothyroxine 75 MICROGram(s) Oral daily  losartan 100 milliGRAM(s) Oral daily  pantoprazole    Tablet 40 milliGRAM(s) Oral before breakfast  simvastatin 20 milliGRAM(s) Oral at bedtime    MEDICATIONS  (PRN):  acetaminophen     Tablet .. 650 milliGRAM(s) Oral every 6 hours PRN Temp greater or equal to 38C (100.4F), Moderate Pain (4 - 6)      Vital Signs Last 24 Hrs  T(C): 36.8 (29 Dec 2022 05:55), Max: 36.8 (29 Dec 2022 05:55)  T(F): 98.2 (29 Dec 2022 05:55), Max: 98.2 (29 Dec 2022 05:55)  HR: 64 (29 Dec 2022 05:55) (61 - 90)  BP: 147/69 (29 Dec 2022 05:55) (130/66 - 209/96)  BP(mean): --  RR: 18 (29 Dec 2022 05:55) (18 - 18)  SpO2: 96% (29 Dec 2022 05:55) (95% - 98%)    Parameters below as of 29 Dec 2022 05:55  Patient On (Oxygen Delivery Method): room air        PHYSICAL EXAMINATION:  GENERAL: NAD, well built  HEAD:  Atraumatic, Normocephalic  EYES:  conjunctiva and sclera clear  NECK: Supple, No JVD, Normal thyroid  CHEST/LUNG: Clear to auscultation. Clear to percussion bilaterally; No rales, rhonchi, wheezing, or rubs  HEART: Regular rate and rhythm; No murmurs, rubs, or gallops  ABDOMEN: Soft, Nontender, Nondistended; Bowel sounds present, no pain or masses on palpation  NERVOUS SYSTEM:  Alert & Oriented X3  : voiding well  EXTREMITIES:  2+ Peripheral Pulses, No clubbing, cyanosis, or edema  SKIN: warm dry                          9.7    9.24  )-----------( 320      ( 29 Dec 2022 06:40 )             31.2     12-29    138  |  102  |  32<H>  ----------------------------<  124<H>  4.8   |  27  |  1.17    Ca    10.0      29 Dec 2022 06:40  Phos  4.4     12-29  Mg     2.0     12-29    TPro  7.3  /  Alb  3.6  /  TBili  0.2  /  DBili  x   /  AST  14  /  ALT  16  /  AlkPhos  92  12-29    LIVER FUNCTIONS - ( 29 Dec 2022 06:40 )  Alb: 3.6 g/dL / Pro: 7.3 g/dL / ALK PHOS: 92 U/L / ALT: 16 U/L DA / AST: 14 U/L / GGT: x                   I&O's Summary          CAPILLARY BLOOD GLUCOSE      RADIOLOGY & ADDITIONAL TESTS:                   PGY-1 Progress Note discussed with attending      PLEASE CONTACT ON CALL TEAM:  - On Call Team (Please refer to Misbah) FROM 5:00 PM - 8:30PM  - Nightfloat Team FROM 8:30 -7:30 AM    INTERVAL HPI/OVERNIGHT EVENTS:     Review of Systems:   CONSTITUTIONAL: No fever, weight loss, or fatigue  RESPIRATORY: No cough, wheezing, chills or hemoptysis; No shortness of breath  CARDIOVASCULAR: No chest pain, palpitations, dizziness, or leg swelling  GASTROINTESTINAL: No abdominal pain. No nausea, vomiting, or ; No diarrhea or constipation  GENITOURINARY: No hematuria, urinary frequency  + dysuria  NEUROLOGICAL: No headaches, memory loss. + Loss of strength and numbness. Cool sensation to perineal region.   ENDOCRINE: No polyuria, polydipsia, or heat/cold intolerance  MUSKULOSKELETAL: No muscle aches, joint pains  HEME: no easy bruisability, no tender or enlarged lymph nodes  SKIN: No itching, burning, rashes, or lesions .    MEDICATIONS  (STANDING):  aspirin enteric coated 81 milliGRAM(s) Oral daily  enoxaparin Injectable 40 milliGRAM(s) SubCutaneous every 24 hours  FLUoxetine 20 milliGRAM(s) Oral daily  gabapentin 400 milliGRAM(s) Oral three times a day  insulin lispro (ADMELOG) corrective regimen sliding scale   SubCutaneous three times a day before meals  insulin lispro (ADMELOG) corrective regimen sliding scale   SubCutaneous at bedtime  levothyroxine 75 MICROGram(s) Oral daily  losartan 100 milliGRAM(s) Oral daily  pantoprazole    Tablet 40 milliGRAM(s) Oral before breakfast  simvastatin 20 milliGRAM(s) Oral at bedtime    MEDICATIONS  (PRN):  acetaminophen     Tablet .. 650 milliGRAM(s) Oral every 6 hours PRN Temp greater or equal to 38C (100.4F), Moderate Pain (4 - 6)      Vital Signs Last 24 Hrs  T(C): 36.8 (29 Dec 2022 05:55), Max: 36.8 (29 Dec 2022 05:55)  T(F): 98.2 (29 Dec 2022 05:55), Max: 98.2 (29 Dec 2022 05:55)  HR: 64 (29 Dec 2022 05:55) (61 - 90)  BP: 147/69 (29 Dec 2022 05:55) (130/66 - 209/96)  BP(mean): --  RR: 18 (29 Dec 2022 05:55) (18 - 18)  SpO2: 96% (29 Dec 2022 05:55) (95% - 98%)    Parameters below as of 29 Dec 2022 05:55  Patient On (Oxygen Delivery Method): room air        Physical Exam: GENERAL: Mild distress, laying in bed, obese  HEAD:  Atraumatic, Normocephalic  EYES: EOMI, PERRLA, conjunctiva and sclera clear  NECK: Supple  CHEST/LUNG: Clear to auscultation bilaterally, no RRW  HEART: Regular rate and rhythm; No murmurs, rubs, or gallops  ABDOMEN: Soft, Nontender, Nondistended; Bowel sounds present  EXTREMITIES:  2+ Peripheral Pulses, No edema  PSYCH: AAOx3  NEUROLOGY: Weakness LE B/L 3/5 strength on flexion and extension full ROM, unable to wiggle toes. Decreased sensation b/l to regular touch up to ankle, Upper extremity 5/5 strength and full ROM. F2N test negative, shin to heel unable to perform. No pronator drift. unable to hold legs up for more than 2 seconds. Straight leg test positive left side  SKIN: abdominal scar healed                            9.7    9.24  )-----------( 320      ( 29 Dec 2022 06:40 )             31.2     12-29    138  |  102  |  32<H>  ----------------------------<  124<H>  4.8   |  27  |  1.17    Ca    10.0      29 Dec 2022 06:40  Phos  4.4     12-29  Mg     2.0     12-29    TPro  7.3  /  Alb  3.6  /  TBili  0.2  /  DBili  x   /  AST  14  /  ALT  16  /  AlkPhos  92  12-29    LIVER FUNCTIONS - ( 29 Dec 2022 06:40 )  Alb: 3.6 g/dL / Pro: 7.3 g/dL / ALK PHOS: 92 U/L / ALT: 16 U/L DA / AST: 14 U/L / GGT: x                   I&O's Summary          CAPILLARY BLOOD GLUCOSE      RADIOLOGY & ADDITIONAL TESTS:        < from: CT Pelvis Bony Only No Cont (12.28.22 @ 16:53) >  Findings:    No acute fracture is identified.The hip joint spaces are preserved.    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 There   are degenerative changes at L5-S1.    Oral contrast is noted within the colon and rectum. Status post   hysterectomy.    Impression: Noacute fracture.    --- End of Report ---    < end of copied text >  < from: CT Head No Cont (12.28.22 @ 16:52) >  IMPRESSION:    No acute intracranial bleeding.    Posterior periventricular white matter chronic microvascular ischemic   change.    < end of copied text >             PGY-1 Progress Note discussed with attending      PLEASE CONTACT ON CALL TEAM:  - On Call Team (Please refer to Misbah) FROM 5:00 PM - 8:30PM  - Nightfloat Team FROM 8:30 -7:30 AM    INTERVAL HPI/OVERNIGHT EVENTS: Patient moved to floor early morning, endorses trouble sleeping. Patient examined bedside this AM. Patient endorses weakness to bilateral lower extremities. Endorses cold sensation to vaginal area for two weeks. States she is unable to lift up or move her legs. No complaints of fever, nausea vomiting or shortness of breath.    Review of Systems:   CONSTITUTIONAL: No fever, weight loss, or fatigue  RESPIRATORY: No cough, wheezing, chills or hemoptysis; No shortness of breath  CARDIOVASCULAR: No chest pain, palpitations, dizziness, or leg swelling  GASTROINTESTINAL: No abdominal pain. No nausea, vomiting, or ; No diarrhea or constipation  GENITOURINARY: No hematuria, urinary frequency  + dysuria  NEUROLOGICAL: No headaches, memory loss. + Loss of strength and numbness. Cool sensation to perineal region.   ENDOCRINE: No polyuria, polydipsia, or heat/cold intolerance  MUSKULOSKELETAL: No muscle aches, joint pains  HEME: no easy bruisability, no tender or enlarged lymph nodes  SKIN: No itching, burning, rashes, or lesions .    MEDICATIONS  (STANDING):  aspirin enteric coated 81 milliGRAM(s) Oral daily  enoxaparin Injectable 40 milliGRAM(s) SubCutaneous every 24 hours  FLUoxetine 20 milliGRAM(s) Oral daily  gabapentin 400 milliGRAM(s) Oral three times a day  insulin lispro (ADMELOG) corrective regimen sliding scale   SubCutaneous three times a day before meals  insulin lispro (ADMELOG) corrective regimen sliding scale   SubCutaneous at bedtime  levothyroxine 75 MICROGram(s) Oral daily  losartan 100 milliGRAM(s) Oral daily  pantoprazole    Tablet 40 milliGRAM(s) Oral before breakfast  simvastatin 20 milliGRAM(s) Oral at bedtime    MEDICATIONS  (PRN):  acetaminophen     Tablet .. 650 milliGRAM(s) Oral every 6 hours PRN Temp greater or equal to 38C (100.4F), Moderate Pain (4 - 6)      Vital Signs Last 24 Hrs  T(C): 36.8 (29 Dec 2022 05:55), Max: 36.8 (29 Dec 2022 05:55)  T(F): 98.2 (29 Dec 2022 05:55), Max: 98.2 (29 Dec 2022 05:55)  HR: 64 (29 Dec 2022 05:55) (61 - 90)  BP: 147/69 (29 Dec 2022 05:55) (130/66 - 209/96)  BP(mean): --  RR: 18 (29 Dec 2022 05:55) (18 - 18)  SpO2: 96% (29 Dec 2022 05:55) (95% - 98%)    Parameters below as of 29 Dec 2022 05:55  Patient On (Oxygen Delivery Method): room air        Physical Exam: GENERAL: Mild distress, laying in bed, obese  HEAD:  Atraumatic, Normocephalic  EYES: EOMI, PERRLA, conjunctiva and sclera clear  NECK: Supple  CHEST/LUNG: Clear to auscultation bilaterally, no RRW  HEART: Regular rate and rhythm; No murmurs, rubs, or gallops  ABDOMEN: Soft, Nontender, Nondistended; Bowel sounds present  EXTREMITIES:  2+ Peripheral Pulses, No edema  PSYCH: AAOx3  NEUROLOGY: Weakness LE B/L 3/5 strength on flexion and extension full ROM, unable to wiggle toes. Decreased sensation b/l to regular touch up to ankle, Upper extremity 5/5 strength and full ROM. F2N test negative, shin to heel unable to perform. No pronator drift. unable to hold legs up for more than 2 seconds. Straight leg test positive left side  SKIN: abdominal scar healed                            9.7    9.24  )-----------( 320      ( 29 Dec 2022 06:40 )             31.2     12-29    138  |  102  |  32<H>  ----------------------------<  124<H>  4.8   |  27  |  1.17    Ca    10.0      29 Dec 2022 06:40  Phos  4.4     12-29  Mg     2.0     12-29    TPro  7.3  /  Alb  3.6  /  TBili  0.2  /  DBili  x   /  AST  14  /  ALT  16  /  AlkPhos  92  12-29    LIVER FUNCTIONS - ( 29 Dec 2022 06:40 )  Alb: 3.6 g/dL / Pro: 7.3 g/dL / ALK PHOS: 92 U/L / ALT: 16 U/L DA / AST: 14 U/L / GGT: x                   I&O's Summary          CAPILLARY BLOOD GLUCOSE      RADIOLOGY & ADDITIONAL TESTS:        < from: CT Pelvis Bony Only No Cont (12.28.22 @ 16:53) >  Findings:    No acute fracture is identified.The hip joint spaces are preserved.    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 There   are degenerative changes at L5-S1.    Oral contrast is noted within the colon and rectum. Status post   hysterectomy.    Impression: Noacute fracture.    --- End of Report ---    < end of copied text >  < from: CT Head No Cont (12.28.22 @ 16:52) >  IMPRESSION:    No acute intracranial bleeding.    Posterior periventricular white matter chronic microvascular ischemic   change.    < end of copied text >             PGY-1 Progress Note discussed with attending      PLEASE CONTACT ON CALL TEAM:  - On Call Team (Please refer to Misbah) FROM 5:00 PM - 8:30PM  - Nightfloat Team FROM 8:30 -7:30 AM    INTERVAL HPI/OVERNIGHT EVENTS: Patient moved to floor early morning, endorses trouble sleeping. Patient examined bedside this AM. Patient endorses weakness to bilateral lower extremities. Endorses cold sensation to vaginal area for two weeks. States she is unable to lift up or move her legs. No complaints of fever, nausea vomiting or shortness of breath.    Review of Systems:   CONSTITUTIONAL: No fever, weight loss, or fatigue  RESPIRATORY: No cough, wheezing, chills or hemoptysis; No shortness of breath  CARDIOVASCULAR: No chest pain, palpitations, dizziness, or leg swelling  GASTROINTESTINAL: No abdominal pain. No nausea, vomiting, or ; No diarrhea or constipation  GENITOURINARY: No hematuria, urinary frequency  + dysuria  NEUROLOGICAL: No headaches, memory loss. + Loss of strength and numbness. Cool sensation to perineal region.   ENDOCRINE: No polyuria, polydipsia, or heat/cold intolerance  MUSKULOSKELETAL: No muscle aches, joint pains  HEME: no easy bruisability, no tender or enlarged lymph nodes  SKIN: No itching, burning, rashes, or lesions .    MEDICATIONS  (STANDING):  aspirin enteric coated 81 milliGRAM(s) Oral daily  enoxaparin Injectable 40 milliGRAM(s) SubCutaneous every 24 hours  FLUoxetine 20 milliGRAM(s) Oral daily  gabapentin 400 milliGRAM(s) Oral three times a day  insulin lispro (ADMELOG) corrective regimen sliding scale   SubCutaneous three times a day before meals  insulin lispro (ADMELOG) corrective regimen sliding scale   SubCutaneous at bedtime  levothyroxine 75 MICROGram(s) Oral daily  losartan 100 milliGRAM(s) Oral daily  pantoprazole    Tablet 40 milliGRAM(s) Oral before breakfast  simvastatin 20 milliGRAM(s) Oral at bedtime    MEDICATIONS  (PRN):  acetaminophen     Tablet .. 650 milliGRAM(s) Oral every 6 hours PRN Temp greater or equal to 38C (100.4F), Moderate Pain (4 - 6)      Vital Signs Last 24 Hrs  T(C): 36.8 (29 Dec 2022 05:55), Max: 36.8 (29 Dec 2022 05:55)  T(F): 98.2 (29 Dec 2022 05:55), Max: 98.2 (29 Dec 2022 05:55)  HR: 64 (29 Dec 2022 05:55) (61 - 90)  BP: 147/69 (29 Dec 2022 05:55) (130/66 - 209/96)  BP(mean): --  RR: 18 (29 Dec 2022 05:55) (18 - 18)  SpO2: 96% (29 Dec 2022 05:55) (95% - 98%)    Parameters below as of 29 Dec 2022 05:55  Patient On (Oxygen Delivery Method): room air        Physical Exam: GENERAL: Mild distress, laying in bed, obese  HEAD:  Atraumatic, Normocephalic  EYES: EOMI, PERRLA, conjunctiva and sclera clear  NECK: Supple  CHEST/LUNG: Clear to auscultation bilaterally, no RRW  HEART: Regular rate and rhythm; No murmurs, rubs, or gallops  ABDOMEN: Soft, Nontender, Nondistended; Bowel sounds present  EXTREMITIES:  2+ Peripheral Pulses, No edema  PSYCH: AAOx3  NEUROLOGY: Weakness LE B/L 3/5 strength on flexion and extension full ROM, unable to wiggle toes. Decreased sensation b/l to regular touch up to ankle, Upper extremity 5/5 strength and full ROM.   SKIN: abdominal scar healed                            9.7    9.24  )-----------( 320      ( 29 Dec 2022 06:40 )             31.2     12-29    138  |  102  |  32<H>  ----------------------------<  124<H>  4.8   |  27  |  1.17    Ca    10.0      29 Dec 2022 06:40  Phos  4.4     12-29  Mg     2.0     12-29    TPro  7.3  /  Alb  3.6  /  TBili  0.2  /  DBili  x   /  AST  14  /  ALT  16  /  AlkPhos  92  12-29    LIVER FUNCTIONS - ( 29 Dec 2022 06:40 )  Alb: 3.6 g/dL / Pro: 7.3 g/dL / ALK PHOS: 92 U/L / ALT: 16 U/L DA / AST: 14 U/L / GGT: x                   I&O's Summary          CAPILLARY BLOOD GLUCOSE      RADIOLOGY & ADDITIONAL TESTS:        < from: CT Pelvis Bony Only No Cont (12.28.22 @ 16:53) >  Findings:    No acute fracture is identified.The hip joint spaces are preserved.    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 There   are degenerative changes at L5-S1.    Oral contrast is noted within the colon and rectum. Status post   hysterectomy.    Impression: Noacute fracture.    --- End of Report ---    < end of copied text >  < from: CT Head No Cont (12.28.22 @ 16:52) >  IMPRESSION:    No acute intracranial bleeding.    Posterior periventricular white matter chronic microvascular ischemic   change.    < end of copied text >             PGY-1 Progress Note discussed with attending      PLEASE CONTACT ON CALL TEAM:  - On Call Team (Please refer to Misbah) FROM 5:00 PM - 8:30PM  - Nightfloat Team FROM 8:30 -7:30 AM    INTERVAL HPI/OVERNIGHT EVENTS: Patient moved to floor early morning, endorses trouble sleeping. Patient examined bedside this AM. Patient endorses weakness to bilateral lower extremities. Endorses cold sensation to vaginal area for two weeks. States she is unable to lift up or move her legs. No complaints of fever, nausea vomiting or shortness of breath.    Review of Systems:   CONSTITUTIONAL: No fever, weight loss, or fatigue  RESPIRATORY: No cough, wheezing, chills or hemoptysis; No shortness of breath  CARDIOVASCULAR: No chest pain, palpitations, dizziness, or leg swelling  GASTROINTESTINAL: No abdominal pain. No nausea, vomiting, or ; No diarrhea or constipation  GENITOURINARY: No hematuria, urinary frequency  + dysuria  NEUROLOGICAL: No headaches, memory loss. + Loss of strength and numbness. Cool sensation to perineal region.   ENDOCRINE: No polyuria, polydipsia, or heat/cold intolerance  MUSKULOSKELETAL: No muscle aches, joint pains  HEME: no easy bruisability, no tender or enlarged lymph nodes  SKIN: No itching, burning, rashes, or lesions .    MEDICATIONS  (STANDING):  aspirin enteric coated 81 milliGRAM(s) Oral daily  enoxaparin Injectable 40 milliGRAM(s) SubCutaneous every 24 hours  FLUoxetine 20 milliGRAM(s) Oral daily  gabapentin 400 milliGRAM(s) Oral three times a day  insulin lispro (ADMELOG) corrective regimen sliding scale   SubCutaneous three times a day before meals  insulin lispro (ADMELOG) corrective regimen sliding scale   SubCutaneous at bedtime  levothyroxine 75 MICROGram(s) Oral daily  losartan 100 milliGRAM(s) Oral daily  pantoprazole    Tablet 40 milliGRAM(s) Oral before breakfast  simvastatin 20 milliGRAM(s) Oral at bedtime    MEDICATIONS  (PRN):  acetaminophen     Tablet .. 650 milliGRAM(s) Oral every 6 hours PRN Temp greater or equal to 38C (100.4F), Moderate Pain (4 - 6)      Vital Signs Last 24 Hrs  T(C): 36.8 (29 Dec 2022 05:55), Max: 36.8 (29 Dec 2022 05:55)  T(F): 98.2 (29 Dec 2022 05:55), Max: 98.2 (29 Dec 2022 05:55)  HR: 64 (29 Dec 2022 05:55) (61 - 90)  BP: 147/69 (29 Dec 2022 05:55) (130/66 - 209/96)  BP(mean): --  RR: 18 (29 Dec 2022 05:55) (18 - 18)  SpO2: 96% (29 Dec 2022 05:55) (95% - 98%)    Parameters below as of 29 Dec 2022 05:55  Patient On (Oxygen Delivery Method): room air        Physical Exam:   GENERAL: NAD, laying in bed, obese  HEAD:  Atraumatic, Normocephalic  EYES: EOMI, PERRLA, conjunctiva and sclera clear  NECK: Supple  CHEST/LUNG: Clear to auscultation bilaterally, no RRW  HEART: Regular rate and rhythm; No murmurs, rubs, or gallops  ABDOMEN: Soft, Nontender, Nondistended; Bowel sounds present  EXTREMITIES:  2+ Peripheral Pulses, No edema  PSYCH: AAOx3  NEUROLOGY: Weakness LE B/L 3/5 strength on flexion and extension full ROM, unable to wiggle toes. Decreased sensation b/l to regular touch up to ankle, Upper extremity 5/5 strength and full ROM.   SKIN: abdominal scar healed                            9.7    9.24  )-----------( 320      ( 29 Dec 2022 06:40 )             31.2     12-29    138  |  102  |  32<H>  ----------------------------<  124<H>  4.8   |  27  |  1.17    Ca    10.0      29 Dec 2022 06:40  Phos  4.4     12-29  Mg     2.0     12-29    TPro  7.3  /  Alb  3.6  /  TBili  0.2  /  DBili  x   /  AST  14  /  ALT  16  /  AlkPhos  92  12-29    LIVER FUNCTIONS - ( 29 Dec 2022 06:40 )  Alb: 3.6 g/dL / Pro: 7.3 g/dL / ALK PHOS: 92 U/L / ALT: 16 U/L DA / AST: 14 U/L / GGT: x                   I&O's Summary          CAPILLARY BLOOD GLUCOSE      RADIOLOGY & ADDITIONAL TESTS:        < from: CT Pelvis Bony Only No Cont (12.28.22 @ 16:53) >  Findings:    No acute fracture is identified.The hip joint spaces are preserved.    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 There   are degenerative changes at L5-S1.    Oral contrast is noted within the colon and rectum. Status post   hysterectomy.    Impression: Noacute fracture.    --- End of Report ---    < end of copied text >  < from: CT Head No Cont (12.28.22 @ 16:52) >  IMPRESSION:    No acute intracranial bleeding.    Posterior periventricular white matter chronic microvascular ischemic   change.    < end of copied text >

## 2022-12-29 NOTE — PROGRESS NOTE ADULT - PROBLEM SELECTOR PLAN 1
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 No acute fracture  Likely sciatica related or related to degenerative changes in lumbar region or psych related     F/u MRI lumbar spine  F/u CT Lumbar spine  PT consult  Neuro Dr. Aguirre consulted - Pt presents with LE weakness b/l for one week along with numbness, tingling, pain and fecal/urinary incontinence  - CTH Negative for acute infarction  - CT pelvis - Moderate pubic symphysis arthrosis with chondrocalcinosis and degenerative changes at L5-S1. No acute fracture  - Likely sciatica related or related to degenerative changes in lumbar region or psych related     - F/u MRI lumbar spine  - F/u CT Lumbar spine  - PT consult  - Neuro Dr. Aguirre consulted - Pt presents with LE weakness b/l for one week along with chronic numbness, tingling, pain and fecal/urinary incontinence  - CTH Negative for acute infarction  - CT pelvis - Moderate pubic symphysis arthrosis with chondrocalcinosis and degenerative changes at L5-S1. No acute fracture  - no suspicion of acute cord compression based on timeline of symptoms or clinical exam  - no role for high dose steroids at this time as above and confirmed by Neuro   - f/u MRI cervical, thoracic, and lumbar spine  - PT consult  - Neuro Dr. Aguirre following

## 2022-12-29 NOTE — PROGRESS NOTE ADULT - PROBLEM SELECTOR PLAN 6
Pt with history of HLD takes simvastatin 20mg qhs    C/w Simvastatin - Pt with history of HLD takes simvastatin 20mg qhs    - C/w Simvastatin

## 2022-12-29 NOTE — CONSULT NOTE ADULT - ASSESSMENT
Spastic paraparesis, nearly completely paraplegic.      Cannot exclude UE weakness (due to guarding of effort).      Four-limb hyperreflexia.    Polyarthralgia.    Osteoarthritis.      Cervical myelopathy.      R/O cervical spondylotic myelopathy; R/O spinal neoplasm.    Diabetic peripheral sensory neuropathy.        RECOMMENDATIONS    C-, T. L-spine MRs wo/w contrast (results pending).        There is no neurologic indication for steroid Rx at this time.      ESR, CRP, B12, folate, copper, zinc, methylmalonic acid, homocysteine,     Further management depending on imaging results.

## 2022-12-29 NOTE — PROGRESS NOTE ADULT - PROBLEM SELECTOR PLAN 3
Pt with history of DM, takes metformin 850 BID at home    C/w ISS while in the hospital with goal 140-180 FS - Pt with history of DM, takes metformin 850 BID at home    - C/w ISS while in the hospital with goal 140-180 FS - Pt with history of DM, takes metformin 500 BID at home    - C/w ISS while in the hospital with goal 140-180 FS

## 2022-12-29 NOTE — PATIENT PROFILE ADULT - PUBLIC BENEFITS
no Mod: Presenting symptoms, lack of access to care  Unmod: Homelessness, drug use, SMI, limited social support  Protective: May not be suicidal    Elevated risk by history and current presentation, will hold in ED to reassess Moderate Acute Suicide Risk

## 2022-12-30 ENCOUNTER — INPATIENT (INPATIENT)
Facility: HOSPITAL | Age: 77
LOS: 9 days | Discharge: NURSING, REHAB OR LONG-TERM CARE | End: 2023-01-09
Attending: NEUROLOGICAL SURGERY | Admitting: NEUROLOGICAL SURGERY
Payer: MEDICARE

## 2022-12-30 VITALS
OXYGEN SATURATION: 95 % | TEMPERATURE: 99 F | SYSTOLIC BLOOD PRESSURE: 143 MMHG | DIASTOLIC BLOOD PRESSURE: 75 MMHG | HEART RATE: 79 BPM | RESPIRATION RATE: 18 BRPM

## 2022-12-30 VITALS
OXYGEN SATURATION: 98 % | HEART RATE: 71 BPM | TEMPERATURE: 99 F | DIASTOLIC BLOOD PRESSURE: 93 MMHG | RESPIRATION RATE: 18 BRPM | SYSTOLIC BLOOD PRESSURE: 120 MMHG

## 2022-12-30 DIAGNOSIS — E03.9 HYPOTHYROIDISM, UNSPECIFIED: ICD-10-CM

## 2022-12-30 DIAGNOSIS — I10 ESSENTIAL (PRIMARY) HYPERTENSION: ICD-10-CM

## 2022-12-30 DIAGNOSIS — M48.00 SPINAL STENOSIS, SITE UNSPECIFIED: ICD-10-CM

## 2022-12-30 DIAGNOSIS — E78.5 HYPERLIPIDEMIA, UNSPECIFIED: ICD-10-CM

## 2022-12-30 DIAGNOSIS — R29.898 OTHER SYMPTOMS AND SIGNS INVOLVING THE MUSCULOSKELETAL SYSTEM: ICD-10-CM

## 2022-12-30 DIAGNOSIS — F32.9 MAJOR DEPRESSIVE DISORDER, SINGLE EPISODE, UNSPECIFIED: ICD-10-CM

## 2022-12-30 DIAGNOSIS — E11.9 TYPE 2 DIABETES MELLITUS WITHOUT COMPLICATIONS: ICD-10-CM

## 2022-12-30 PROBLEM — M54.30 SCIATICA, UNSPECIFIED SIDE: Chronic | Status: ACTIVE | Noted: 2022-12-29

## 2022-12-30 LAB
A1C WITH ESTIMATED AVERAGE GLUCOSE RESULT: 6.6 % — HIGH (ref 4–5.6)
ANION GAP SERPL CALC-SCNC: 8 MMOL/L — SIGNIFICANT CHANGE UP (ref 5–17)
APTT BLD: 25.8 SEC — LOW (ref 27–36.3)
BLD GP AB SCN SERPL QL: NEGATIVE — SIGNIFICANT CHANGE UP
BUN SERPL-MCNC: 26 MG/DL — HIGH (ref 7–18)
CALCIUM SERPL-MCNC: 9.9 MG/DL — SIGNIFICANT CHANGE UP (ref 8.4–10.5)
CHLORIDE SERPL-SCNC: 104 MMOL/L — SIGNIFICANT CHANGE UP (ref 96–108)
CO2 SERPL-SCNC: 29 MMOL/L — SIGNIFICANT CHANGE UP (ref 22–31)
CREAT SERPL-MCNC: 1 MG/DL — SIGNIFICANT CHANGE UP (ref 0.5–1.3)
CRP SERPL-MCNC: 13 MG/L — HIGH
EGFR: 57 ML/MIN/1.73M2 — LOW
ERYTHROCYTE [SEDIMENTATION RATE] IN BLOOD: 34 MM/HR — HIGH (ref 0–20)
ESTIMATED AVERAGE GLUCOSE: 143 — SIGNIFICANT CHANGE UP
FOLATE SERPL-MCNC: 6.2 NG/ML — SIGNIFICANT CHANGE UP
GLUCOSE BLDC GLUCOMTR-MCNC: 109 MG/DL — HIGH (ref 70–99)
GLUCOSE BLDC GLUCOMTR-MCNC: 126 MG/DL — HIGH (ref 70–99)
GLUCOSE BLDC GLUCOMTR-MCNC: 139 MG/DL — HIGH (ref 70–99)
GLUCOSE BLDC GLUCOMTR-MCNC: 225 MG/DL — HIGH (ref 70–99)
GLUCOSE SERPL-MCNC: 137 MG/DL — HIGH (ref 70–99)
HCT VFR BLD CALC: 31 % — LOW (ref 34.5–45)
HCYS SERPL-MCNC: 17.3 UMOL/L — HIGH
HGB BLD-MCNC: 9.9 G/DL — LOW (ref 11.5–15.5)
INR BLD: 1.06 RATIO — SIGNIFICANT CHANGE UP (ref 0.88–1.16)
MAGNESIUM SERPL-MCNC: 2.1 MG/DL — SIGNIFICANT CHANGE UP (ref 1.6–2.6)
MCHC RBC-ENTMCNC: 24.4 PG — LOW (ref 27–34)
MCHC RBC-ENTMCNC: 31.9 GM/DL — LOW (ref 32–36)
MCV RBC AUTO: 76.5 FL — LOW (ref 80–100)
NRBC # BLD: 0 /100 WBCS — SIGNIFICANT CHANGE UP (ref 0–0)
PHOSPHATE SERPL-MCNC: 4.2 MG/DL — SIGNIFICANT CHANGE UP (ref 2.5–4.5)
PLATELET # BLD AUTO: 292 K/UL — SIGNIFICANT CHANGE UP (ref 150–400)
POTASSIUM SERPL-MCNC: 4.7 MMOL/L — SIGNIFICANT CHANGE UP (ref 3.5–5.3)
POTASSIUM SERPL-SCNC: 4.7 MMOL/L — SIGNIFICANT CHANGE UP (ref 3.5–5.3)
PROTHROM AB SERPL-ACNC: 12.3 SEC — SIGNIFICANT CHANGE UP (ref 10.5–13.4)
RBC # BLD: 4.05 M/UL — SIGNIFICANT CHANGE UP (ref 3.8–5.2)
RBC # FLD: 15.1 % — HIGH (ref 10.3–14.5)
RH IG SCN BLD-IMP: POSITIVE — SIGNIFICANT CHANGE UP
SODIUM SERPL-SCNC: 141 MMOL/L — SIGNIFICANT CHANGE UP (ref 135–145)
VIT B12 SERPL-MCNC: 459 PG/ML — SIGNIFICANT CHANGE UP (ref 232–1245)
WBC # BLD: 8.64 K/UL — SIGNIFICANT CHANGE UP (ref 3.8–10.5)
WBC # FLD AUTO: 8.64 K/UL — SIGNIFICANT CHANGE UP (ref 3.8–10.5)

## 2022-12-30 PROCEDURE — 72156 MRI NECK SPINE W/O & W/DYE: CPT

## 2022-12-30 PROCEDURE — 84484 ASSAY OF TROPONIN QUANT: CPT

## 2022-12-30 PROCEDURE — 72157 MRI CHEST SPINE W/O & W/DYE: CPT

## 2022-12-30 PROCEDURE — 82525 ASSAY OF COPPER: CPT

## 2022-12-30 PROCEDURE — 84100 ASSAY OF PHOSPHORUS: CPT

## 2022-12-30 PROCEDURE — 83735 ASSAY OF MAGNESIUM: CPT

## 2022-12-30 PROCEDURE — 36415 COLL VENOUS BLD VENIPUNCTURE: CPT

## 2022-12-30 PROCEDURE — 72192 CT PELVIS W/O DYE: CPT | Mod: MA

## 2022-12-30 PROCEDURE — 71045 X-RAY EXAM CHEST 1 VIEW: CPT | Mod: 26

## 2022-12-30 PROCEDURE — 70450 CT HEAD/BRAIN W/O DYE: CPT | Mod: MA

## 2022-12-30 PROCEDURE — 85652 RBC SED RATE AUTOMATED: CPT

## 2022-12-30 PROCEDURE — 84443 ASSAY THYROID STIM HORMONE: CPT

## 2022-12-30 PROCEDURE — 99239 HOSP IP/OBS DSCHRG MGMT >30: CPT | Mod: GC

## 2022-12-30 PROCEDURE — 72158 MRI LUMBAR SPINE W/O & W/DYE: CPT

## 2022-12-30 PROCEDURE — 87635 SARS-COV-2 COVID-19 AMP PRB: CPT

## 2022-12-30 PROCEDURE — 83921 ORGANIC ACID SINGLE QUANT: CPT

## 2022-12-30 PROCEDURE — 99285 EMERGENCY DEPT VISIT HI MDM: CPT

## 2022-12-30 PROCEDURE — 82607 VITAMIN B-12: CPT

## 2022-12-30 PROCEDURE — 84630 ASSAY OF ZINC: CPT

## 2022-12-30 PROCEDURE — 82746 ASSAY OF FOLIC ACID SERUM: CPT

## 2022-12-30 PROCEDURE — A9585: CPT

## 2022-12-30 PROCEDURE — 93010 ELECTROCARDIOGRAM REPORT: CPT

## 2022-12-30 PROCEDURE — 85025 COMPLETE CBC W/AUTO DIFF WBC: CPT

## 2022-12-30 PROCEDURE — 83090 ASSAY OF HOMOCYSTEINE: CPT

## 2022-12-30 PROCEDURE — 85027 COMPLETE CBC AUTOMATED: CPT

## 2022-12-30 PROCEDURE — 80053 COMPREHEN METABOLIC PANEL: CPT

## 2022-12-30 PROCEDURE — 99222 1ST HOSP IP/OBS MODERATE 55: CPT | Mod: 1L

## 2022-12-30 PROCEDURE — 99223 1ST HOSP IP/OBS HIGH 75: CPT

## 2022-12-30 PROCEDURE — 82962 GLUCOSE BLOOD TEST: CPT

## 2022-12-30 PROCEDURE — 86140 C-REACTIVE PROTEIN: CPT

## 2022-12-30 PROCEDURE — 80048 BASIC METABOLIC PNL TOTAL CA: CPT

## 2022-12-30 PROCEDURE — 71045 X-RAY EXAM CHEST 1 VIEW: CPT

## 2022-12-30 RX ORDER — ACETAMINOPHEN 500 MG
650 TABLET ORAL EVERY 6 HOURS
Refills: 0 | Status: DISCONTINUED | OUTPATIENT
Start: 2022-12-30 | End: 2023-01-09

## 2022-12-30 RX ORDER — ACETAMINOPHEN 500 MG
2 TABLET ORAL
Qty: 0 | Refills: 0 | DISCHARGE
Start: 2022-12-30

## 2022-12-30 RX ORDER — OXYCODONE HYDROCHLORIDE 5 MG/1
5 TABLET ORAL EVERY 6 HOURS
Refills: 0 | Status: DISCONTINUED | OUTPATIENT
Start: 2022-12-30 | End: 2023-01-04

## 2022-12-30 RX ORDER — DEXTROSE 50 % IN WATER 50 %
25 SYRINGE (ML) INTRAVENOUS ONCE
Refills: 0 | Status: DISCONTINUED | OUTPATIENT
Start: 2022-12-30 | End: 2023-01-09

## 2022-12-30 RX ORDER — DEXTROSE 50 % IN WATER 50 %
12.5 SYRINGE (ML) INTRAVENOUS ONCE
Refills: 0 | Status: DISCONTINUED | OUTPATIENT
Start: 2022-12-30 | End: 2023-01-09

## 2022-12-30 RX ORDER — LOSARTAN POTASSIUM 100 MG/1
100 TABLET, FILM COATED ORAL DAILY
Refills: 0 | Status: DISCONTINUED | OUTPATIENT
Start: 2022-12-30 | End: 2022-12-30

## 2022-12-30 RX ORDER — CYCLOBENZAPRINE HYDROCHLORIDE 10 MG/1
5 TABLET, FILM COATED ORAL ONCE
Refills: 0 | Status: COMPLETED | OUTPATIENT
Start: 2022-12-30 | End: 2022-12-30

## 2022-12-30 RX ORDER — FLUOXETINE HCL 10 MG
20 CAPSULE ORAL DAILY
Refills: 0 | Status: DISCONTINUED | OUTPATIENT
Start: 2022-12-30 | End: 2023-01-09

## 2022-12-30 RX ORDER — LOSARTAN POTASSIUM 100 MG/1
1 TABLET, FILM COATED ORAL
Qty: 0 | Refills: 0 | DISCHARGE
Start: 2022-12-30

## 2022-12-30 RX ORDER — GLUCAGON INJECTION, SOLUTION 0.5 MG/.1ML
1 INJECTION, SOLUTION SUBCUTANEOUS ONCE
Refills: 0 | Status: DISCONTINUED | OUTPATIENT
Start: 2022-12-30 | End: 2023-01-09

## 2022-12-30 RX ORDER — GABAPENTIN 400 MG/1
400 CAPSULE ORAL THREE TIMES A DAY
Refills: 0 | Status: DISCONTINUED | OUTPATIENT
Start: 2022-12-30 | End: 2023-01-09

## 2022-12-30 RX ORDER — INSULIN LISPRO 100/ML
VIAL (ML) SUBCUTANEOUS
Refills: 0 | Status: DISCONTINUED | OUTPATIENT
Start: 2022-12-30 | End: 2023-01-09

## 2022-12-30 RX ORDER — PANTOPRAZOLE SODIUM 20 MG/1
40 TABLET, DELAYED RELEASE ORAL
Refills: 0 | Status: DISCONTINUED | OUTPATIENT
Start: 2022-12-30 | End: 2023-01-09

## 2022-12-30 RX ORDER — SIMVASTATIN 20 MG/1
20 TABLET, FILM COATED ORAL AT BEDTIME
Refills: 0 | Status: DISCONTINUED | OUTPATIENT
Start: 2022-12-30 | End: 2023-01-09

## 2022-12-30 RX ORDER — METFORMIN HYDROCHLORIDE 850 MG/1
500 TABLET ORAL
Refills: 0 | Status: DISCONTINUED | OUTPATIENT
Start: 2022-12-30 | End: 2022-12-30

## 2022-12-30 RX ORDER — LANOLIN ALCOHOL/MO/W.PET/CERES
6 CREAM (GRAM) TOPICAL AT BEDTIME
Refills: 0 | Status: DISCONTINUED | OUTPATIENT
Start: 2022-12-30 | End: 2023-01-09

## 2022-12-30 RX ORDER — DEXTROSE 50 % IN WATER 50 %
15 SYRINGE (ML) INTRAVENOUS ONCE
Refills: 0 | Status: DISCONTINUED | OUTPATIENT
Start: 2022-12-30 | End: 2023-01-09

## 2022-12-30 RX ORDER — SODIUM CHLORIDE 9 MG/ML
1000 INJECTION, SOLUTION INTRAVENOUS
Refills: 0 | Status: DISCONTINUED | OUTPATIENT
Start: 2022-12-30 | End: 2023-01-09

## 2022-12-30 RX ORDER — INSULIN LISPRO 100/ML
VIAL (ML) SUBCUTANEOUS AT BEDTIME
Refills: 0 | Status: DISCONTINUED | OUTPATIENT
Start: 2022-12-30 | End: 2023-01-09

## 2022-12-30 RX ORDER — BACLOFEN 100 %
10 POWDER (GRAM) MISCELLANEOUS EVERY 8 HOURS
Refills: 0 | Status: DISCONTINUED | OUTPATIENT
Start: 2022-12-30 | End: 2023-01-09

## 2022-12-30 RX ORDER — LOSARTAN POTASSIUM 100 MG/1
100 TABLET, FILM COATED ORAL DAILY
Refills: 0 | Status: DISCONTINUED | OUTPATIENT
Start: 2022-12-30 | End: 2023-01-09

## 2022-12-30 RX ORDER — LEVOTHYROXINE SODIUM 125 MCG
75 TABLET ORAL DAILY
Refills: 0 | Status: DISCONTINUED | OUTPATIENT
Start: 2022-12-30 | End: 2023-01-09

## 2022-12-30 RX ADMIN — Medication 75 MICROGRAM(S): at 05:46

## 2022-12-30 RX ADMIN — OXYCODONE HYDROCHLORIDE 5 MILLIGRAM(S): 5 TABLET ORAL at 19:59

## 2022-12-30 RX ADMIN — OXYCODONE HYDROCHLORIDE 5 MILLIGRAM(S): 5 TABLET ORAL at 21:05

## 2022-12-30 RX ADMIN — Medication 2: at 12:24

## 2022-12-30 RX ADMIN — Medication 10 MILLIGRAM(S): at 22:47

## 2022-12-30 RX ADMIN — GABAPENTIN 400 MILLIGRAM(S): 400 CAPSULE ORAL at 22:24

## 2022-12-30 RX ADMIN — Medication 20 MILLIGRAM(S): at 11:35

## 2022-12-30 RX ADMIN — Medication 6 MILLIGRAM(S): at 23:23

## 2022-12-30 RX ADMIN — SIMVASTATIN 20 MILLIGRAM(S): 20 TABLET, FILM COATED ORAL at 22:24

## 2022-12-30 RX ADMIN — Medication 650 MILLIGRAM(S): at 11:36

## 2022-12-30 RX ADMIN — GABAPENTIN 400 MILLIGRAM(S): 400 CAPSULE ORAL at 13:21

## 2022-12-30 RX ADMIN — GABAPENTIN 400 MILLIGRAM(S): 400 CAPSULE ORAL at 05:45

## 2022-12-30 RX ADMIN — PANTOPRAZOLE SODIUM 40 MILLIGRAM(S): 20 TABLET, DELAYED RELEASE ORAL at 05:45

## 2022-12-30 RX ADMIN — ENOXAPARIN SODIUM 40 MILLIGRAM(S): 100 INJECTION SUBCUTANEOUS at 05:46

## 2022-12-30 RX ADMIN — LOSARTAN POTASSIUM 100 MILLIGRAM(S): 100 TABLET, FILM COATED ORAL at 22:24

## 2022-12-30 RX ADMIN — LOSARTAN POTASSIUM 100 MILLIGRAM(S): 100 TABLET, FILM COATED ORAL at 05:47

## 2022-12-30 RX ADMIN — Medication 81 MILLIGRAM(S): at 11:35

## 2022-12-30 RX ADMIN — CYCLOBENZAPRINE HYDROCHLORIDE 5 MILLIGRAM(S): 10 TABLET, FILM COATED ORAL at 16:03

## 2022-12-30 RX ADMIN — Medication 650 MILLIGRAM(S): at 12:04

## 2022-12-30 NOTE — PROGRESS NOTE ADULT - PROBLEM SELECTOR PLAN 7
Pt with history of depression, takes Prozac 20mg daily at home (for many years)    C/w Prozac
Pt with history of depression, takes Prozac 20mg daily at home (for many years)    C/w Prozac

## 2022-12-30 NOTE — PROGRESS NOTE ADULT - PROBLEM SELECTOR PLAN 4
- Pt with history of hypothyroidism, takes Levothyroxine 75 mg at home     - C/w Levothyroxine 75 mg daily

## 2022-12-30 NOTE — PROGRESS NOTE ADULT - ASSESSMENT
This is a  78 yo F from home (HHA7d/10hr) with pmhx  HTN, HLD, DM on metformin, hypothyroidism, depression, and sciatica who presents with b/l lower leg weakness. Admitted for ambulatory dysfunction.    
This is a  76 yo F from home (HHA7d/10hr) with pmhx  HTN, HLD, DM on metformin, hypothyroidism, depression, and sciatica who presents with b/l lower leg weakness. Admitted for ambulatory dysfunction.

## 2022-12-30 NOTE — ACUTE INTERFACILITY TRANSFER NOTE - HOSPITAL COURSE
Patient s a 77 year old Female from home (HHA7d/10hr) with past medical history of HTN, HLD, DM on metformin, hypothyroidism, depression, previous cervical spine surgery, and sciatica who presents with b/l lower leg weakness. There was concern of possible bladder/bowel incontinence however this was exclueded.  There was further concern for cord compressoin, this would latter be ruled out on closer exam and with aid of imagining studies.  Neuro was consulted and recommended MRI evaluation of cervial, lumbar, and thoracis spine.  on review of imagining neurology recommends ortho/spine surgery consult which is the reason for the transfer. Patient s a 77 year old Female from home (HHA7d/10hr) with past medical history of HTN, HLD, DM on metformin, hypothyroidism, depression, previous cervical spine surgery, and sciatica who presents with b/l lower leg weakness. Patient has sporadic bladder/bowel incontinence .  There was concern for cord compression.  Neuro was consulted and recommended MRI evaluation of cervical lumbar, and thoracis spine.  MRI lumbar did not reveal overt evidence of cord compression  On review of imagining neurology recommends ortho/spine surgery consult which is the reason for the transfer.

## 2022-12-30 NOTE — PROGRESS NOTE ADULT - PROBLEM SELECTOR PLAN 5
- Pt with history of HTN, takes Losartan-HCTZ 100-12.5 mg at home    - C/w Losartan 100mg with holding parameters

## 2022-12-30 NOTE — PROGRESS NOTE ADULT - PROBLEM SELECTOR PLAN 2
- Pt with history of left sided sciatica, takes Meloxicam 5mg at home     - C/w Tylenol PRN for pain

## 2022-12-30 NOTE — H&P ADULT - NS ATTEND AMEND GEN_ALL_CORE FT
I have personally seen , performed an exam and reviewed her imaging on PACS, and discussed her presentation with her daughter and son-in-law. 66 yo with a protracted hx of prior C5-7 ACDF, depression and cognitive issues, neuropathy/DM, presenting with difficulty ambulating and bilateral foot weakness and numbness. Per the daughter and son-in-law patient had not been ambulatory or getting out of bed for 1-2 years, She denies any issues with her UEs, and no interscapular neck pains, She had had some chronic back pain and left sided sciatica in the past months ago, but this had not been an issue over the past few months and currently. She can feel the urge and can void using diapers.   Hx of colon cancer that  was concerned to have recurrence based on CEA.    On exam, she does have stocking hypoesthesia bilaterally in the feet, to mid legs, with 0-1 weakness in DF/PF/EHL bilaterally, while HF is 2-3/5  Otherwise 4/5 in KE/KF. She does not have any LE myelopathy signs.  Her MRI C Spine with severe stenosis T1-2 with cord signal change, and L spine with L4-S1 stenosis.     Her presentation is quite complex, with the patient having recall issues. I am not certain how much her stenosis at T1-2 or L4-S1 is the main contributer to her long standing weakness. We have endeavored to get a repeat CT C/A/P with no obvious mets, as well as obtaining outside oncology records.   I do share the concerns of the family re her long standing poor functioning status at baseline, and that the aim of surgery would be to perhaps help with her lower extremity weakness for the sake of mobilization. The patient has, while in patient, declined to work with PT in any sense, and would not want to mobilize even with support, even for a very limited time frame. I am worried that putting her through surgery for both her T1-2 and L4-S1 stenosis may lead to further declines in terms of wound infection and worse functioning performance. We will discuss this further with her family to finalize plan.    I have spent 60 minutes in this encounter.    Juan A Cedeño MD

## 2022-12-30 NOTE — H&P ADULT - NSHPPHYSICALEXAM_GEN_ALL_CORE
AOx3, appropriate, follows commands  PERRL, EOMI, face symmetrical   Motor: BUE 5/5  RLE: HF 2/5, KE 3/5, DF/PF 0/5, EHL 1/5  LLE: HF 3-/5, KE 3/5, DF 2/5, PF 1/5  Bicep reflexes+, patellar and achilles reflexes 0  No clonus  Sensation intact to light touch   No hoffmans

## 2022-12-30 NOTE — H&P ADULT - NSHPLABSRESULTS_GEN_ALL_CORE
< from: MR Thoracic Spine w/wo IV Cont (12.29.22 @ 13:07) >    IMPRESSION: No abnormal parenchymal or leptomeningeal enhancement.   Moderate degenerative changes C2-3 and T1 to the top C5 complexes and   ligamentous hypertrophy. Previous anterior cervical discectomy C5-6 and   C6-7. Multilevel thoracic disc osteophyte complexes largest at T8-9 and   T10-11. The T10-11 disc osteophyte abuts but does not compress the cord.    Transitional vertebra at the lumbosacral junction with equalization of L5   and a hypoplastic disc space L5-S1. Severe spinal stenosis L3-4 and L4-5   due to degenerative changes.    < end of copied text >    < from: MR Lumbar Spine w/wo IV Cont (12.29.22 @ 13:06) >  severe   IMPRESSION: No abnormal parenchymal or leptomeningeal enhancement.   Moderate degenerative changes C2-3 and T1 to the top C5 complexes and   ligamentous hypertrophy. Previous anterior cervical discectomy C5-6 and   C6-7. Multilevel thoracic disc osteophyte complexes largest at T8-9 and   T10-11. The T10-11 disc osteophyte abuts but does not compress the cord.    Transitional vertebra at the lumbosacral junction with equalization of L5   and a hypoplastic disc space L5-S1. Severe spinal stenosis L3-4 and L4-5   due to degenerative changes.    < end of copied text >

## 2022-12-30 NOTE — PATIENT PROFILE ADULT - FALL HARM RISK - HARM RISK INTERVENTIONS

## 2022-12-30 NOTE — CONSULT NOTE ADULT - ASSESSMENT
76 yo F PMH HTN, HLD, DM on metformin, hypothyroidism, depression, and sciatica presents with b/l lower extremity weakness with MRI findings of severe spinal stenosis at L3-4, L4-5, degenerative changes and stenosis adjacent to prior ACDF at C2-T2     76 yo F PMH HTN, HLD, DM on metformin, hypothyroidism, depression, and sciatica presents with b/l lower extremity weakness with MRI findings of severe spinal stenosis at L3-4, L4-5, degenerative changes and stenosis adjacent to prior ACDF at C2-T2.  Medicine consulted for medical optimization prior to tentative procedure, pending EKG and CXR    #Bilateral LE weakness 2/2 severe spinal stenosis: CT C/T/L spine pending, plan per NSG.  #HTN: well-controlled, c/w home meds for now, would hold ACE/arb day of procedure  #T2DM: well-controlled, A1c 6.6, c/w ISS   #Hypothyroidism: TSH mildly elevated, c/w home dose of Synthroid, outpt followup labs 4-6 weeks  #Sciatica. history of left sided sciatica, takes Meloxicam 5mg at home. C/w Tylenol PRN for pain.  #HLD: c/w statin  #Major depression: c/w Prozac.

## 2022-12-30 NOTE — PROGRESS NOTE ADULT - PROBLEM SELECTOR PLAN 3
- Pt with history of DM, takes metformin 500 BID at home    - C/w ISS while in the hospital with goal 140-180 FS

## 2022-12-30 NOTE — H&P ADULT - NSICDXPASTSURGICALHX_GEN_ALL_CORE_FT
PAST SURGICAL HISTORY:  No significant past surgical history      PAST SURGICAL HISTORY:  S/P cervical spinal fusion

## 2022-12-30 NOTE — H&P ADULT - NSICDXPASTMEDICALHX_GEN_ALL_CORE_FT
PAST MEDICAL HISTORY:  DM (diabetes mellitus)     HLD (hyperlipidemia)     HTN (hypertension)     Hypothyroidism     Major depression     Sciatica      PAST MEDICAL HISTORY:  Colon cancer     DM (diabetes mellitus)     HLD (hyperlipidemia)     HTN (hypertension)     Hypothyroidism     Major depression     Sciatica

## 2022-12-30 NOTE — H&P ADULT - HISTORY OF PRESENT ILLNESS
76 yo F PMH HTN, HLD, DM on metformin, hypothyroidism, depression, and sciatica presents with b/l lower extremity weakness. Pt reports she had a fall about 2 weeks ago and weakness began suddenly one week ago. She has been unable to get out of bed and walk at home. Pt states she has associated numbness, tingling, and pain 7/10 constant in her posterior thighs and lower legs. Pt took acetaminophen at home for pain which helped. Pt also endorses urinary incontinence and stool incontinence and chronic dysuria. Pt denies fever, chills, headache, CP, SOB, N/V/D, or constipation. 76 yo F PMH HTN, HLD, DM on metformin, hypothyroidism, depression, and sciatica presents with b/l lower extremity weakness. Pt reports she had a fall about 2 weeks ago and weakness began suddenly one week ago. She has been unable to get out of bed and walk at home. Pt states she has associated numbness, tingling, and pain 7/10 constant in her posterior thighs and lower legs. Pt took acetaminophen at home for pain which helped. Pt also endorses urinary incontinence and stool incontinence and chronic dysuria. Pt denies fever, chills, headache, CP, SOB, N/V/D, or constipation.    Does have lower back pain, non radicular    Patient also report to have ovarian cancer and had oopherectomy 2 years ago, unclear if chemo needed.     PMD is out of [place] Dr. Jorge L Hung from [place]  78 yo F PMH HTN, HLD, DM on metformin, hypothyroidism, depression, and sciatica presents with b/l lower extremity weakness. Pt reports she had a fall about 2 weeks ago and weakness began suddenly one week ago. She has been unable to get out of bed and walk at home. Pt states she has associated numbness, tingling, and pain 7/10 constant in her posterior thighs and lower legs. Pt took acetaminophen at home for pain which helped. Pt also endorses urinary incontinence and stool incontinence and chronic dysuria. Pt denies fever, chills, headache, CP, SOB, N/V/D, or constipation.    Does have lower back pain, non radicular    Patient also report to have ??ovarian cancer and had oopherectomy 2 years ago, unclear if chemo needed.     PMD is out of [place] Dr. Jorge L Hung from [place]

## 2022-12-30 NOTE — PROGRESS NOTE ADULT - SUBJECTIVE AND OBJECTIVE BOX
PGY-1 Progress Note discussed with attending      PLEASE CONTACT ON CALL TEAM:  - On Call Team (Please refer to Misbah) FROM 5:00 PM - 8:30PM  - Nightfloat Team FROM 8:30 -7:30 AM    INTERVAL HPI/OVERNIGHT EVENTS: No acute overnight events. Patient endorses she feels constipated and cannot tell whether she is moving a bowel or not. Patient endorses continous weakness to bilateral lower extremities. Endorses cold sensation to vaginal area for two weeks. No complaints of fever, nausea vomiting      Review of Systems:   CONSTITUTIONAL: No fever, weight loss, or fatigue  RESPIRATORY: No cough, wheezing, chills or hemoptysis; No shortness of breath  CARDIOVASCULAR: No chest pain, palpitations, dizziness, or leg swelling  GASTROINTESTINAL: No abdominal pain. No nausea, vomiting, or ; No diarrhea or constipation  GENITOURINARY: No hematuria, urinary frequency  + dysuria  NEUROLOGICAL: No headaches, memory loss. + Loss of strength and numbness. Cool sensation to perineal region.   ENDOCRINE: No polyuria, polydipsia, or heat/cold intolerance  MUSKULOSKELETAL: No muscle aches, joint pains  HEME: no easy bruisability, no tender or enlarged lymph nodes  SKIN: No itching, burning, rashes, or lesions    MEDICATIONS  (STANDING):  aspirin enteric coated 81 milliGRAM(s) Oral daily  enoxaparin Injectable 40 milliGRAM(s) SubCutaneous every 24 hours  FLUoxetine 20 milliGRAM(s) Oral daily  gabapentin 400 milliGRAM(s) Oral three times a day  insulin lispro (ADMELOG) corrective regimen sliding scale   SubCutaneous three times a day before meals  insulin lispro (ADMELOG) corrective regimen sliding scale   SubCutaneous at bedtime  levothyroxine 75 MICROGram(s) Oral daily  losartan 100 milliGRAM(s) Oral daily  melatonin 5 milliGRAM(s) Oral at bedtime  pantoprazole    Tablet 40 milliGRAM(s) Oral before breakfast  simvastatin 20 milliGRAM(s) Oral at bedtime    MEDICATIONS  (PRN):  acetaminophen     Tablet .. 650 milliGRAM(s) Oral every 6 hours PRN Temp greater or equal to 38C (100.4F), Moderate Pain (4 - 6)      Vital Signs Last 24 Hrs  T(C): 36.7 (30 Dec 2022 05:46), Max: 37.9 (29 Dec 2022 19:53)  T(F): 98.1 (30 Dec 2022 05:46), Max: 100.3 (29 Dec 2022 19:53)  HR: 63 (30 Dec 2022 05:46) (63 - 67)  BP: 154/80 (30 Dec 2022 05:46) (144/78 - 159/79)  BP(mean): --  RR: 16 (30 Dec 2022 05:46) (16 - 20)  SpO2: 94% (30 Dec 2022 05:46) (94% - 94%)    Parameters below as of 30 Dec 2022 05:46  Patient On (Oxygen Delivery Method): room air        Physical Exam:   GENERAL: NAD, laying in bed, obese  HEAD:  Atraumatic, Normocephalic  EYES: EOMI, PERRLA, conjunctiva and sclera clear  NECK: Supple  CHEST/LUNG: Clear to auscultation bilaterally, no RRW  HEART: Regular rate and rhythm; No murmurs, rubs, or gallops  ABDOMEN: Soft, Nontender, Nondistended; Bowel sounds present  EXTREMITIES:  2+ Peripheral Pulses, No edema  PSYCH: AAOx3  NEUROLOGY: Weakness LE B/L 3/5 strength on flexion and extension full ROM, unable to wiggle toes. Decreased protective and epicritic sensation b/l to regular touch up to ankle, Upper extremity 5/5 strength and full ROM and intact sensation. Unable to lift legs b/l >3 seconds. Unable to perform shin to heel test.  SKIN: abdominal scar healed                        9.9    8.64  )-----------( 292      ( 30 Dec 2022 07:15 )             31.0     12-30    141  |  104  |  26<H>  ----------------------------<  137<H>  4.7   |  29  |  1.00    Ca    9.9      30 Dec 2022 07:15  Phos  4.2     12-30  Mg     2.1     12-30    TPro  7.3  /  Alb  3.6  /  TBili  0.2  /  DBili  x   /  AST  14  /  ALT  16  /  AlkPhos  92  12-29    LIVER FUNCTIONS - ( 29 Dec 2022 06:40 )  Alb: 3.6 g/dL / Pro: 7.3 g/dL / ALK PHOS: 92 U/L / ALT: 16 U/L DA / AST: 14 U/L / GGT: x                   I&O's Summary          CAPILLARY BLOOD GLUCOSE      RADIOLOGY & ADDITIONAL TESTS:    < from: MR Thoracic Spine w/wo IV Cont (12.29.22 @ 13:07) >  IMPRESSION: No abnormal parenchymal or leptomeningeal enhancement.   Moderate degenerative changes C2-3 and T1 to the top C5 complexes and   ligamentous hypertrophy. Previous anterior cervical discectomy C5-6 and   C6-7. Multilevel thoracic disc osteophyte complexes largest at T8-9 and   T10-11. The T10-11 disc osteophyte abuts but does not compress the cord.    Transitional vertebra at the lumbosacral junction with equalization of L5   and a hypoplastic disc space L5-S1. Severe spinal stenosis L3-4 and L4-5   due to degenerative changes.    < end of copied text >

## 2022-12-30 NOTE — CONSULT NOTE ADULT - SUBJECTIVE AND OBJECTIVE BOX
CHIEF COMPLAINT: Patient is a 77y old  Female who presents with a chief complaint of cervical, thoracic stenosis (30 Dec 2022 17:09)      HPI: HPI:  76 yo F PMH HTN, HLD, DM on metformin, hypothyroidism, depression, and sciatica presents with b/l lower extremity weakness. Pt reports she had a fall about 2 weeks ago and weakness began suddenly one week ago. She has been unable to get out of bed and walk at home. Pt states she has associated numbness, tingling, and pain 7/10 constant in her posterior thighs and lower legs. Pt took acetaminophen at home for pain which helped. Pt also endorses urinary incontinence and stool incontinence and chronic dysuria. Pt denies fever, chills, headache, CP, SOB, N/V/D, or constipation.    Does have lower back pain, non radicular    Patient also report to have ovarian cancer and had oopherectomy 2 years ago, unclear if chemo needed.     PMD is out of [place] Dr. Jorge L Hung from [place]  (30 Dec 2022 17:09)    Interval history: Pt had MRI at  which showed, severe spinal stenosis at L3-4, L4-5, degenerative changes and stenosis adjacent to prior ACDF at C2-T2, pt was transferred to [facility] for surgical eval under NSG.         Pain Symptoms if applicable:             	                           none	    mild         moderate         severe  	                            0	     1-3	      4-6	          7-10  Pain:  Location:	  Modifying factors:	  Associated symptoms:	    Allergies    No Known Allergies    Intolerances        HOME MEDICATIONS: [x] Reviewed    MEDICATIONS  (STANDING):  baclofen 10 milliGRAM(s) Oral every 8 hours  dextrose 5%. 1000 milliLiter(s) (50 mL/Hr) IV Continuous <Continuous>  dextrose 5%. 1000 milliLiter(s) (100 mL/Hr) IV Continuous <Continuous>  dextrose 50% Injectable 25 Gram(s) IV Push once  dextrose 50% Injectable 12.5 Gram(s) IV Push once  dextrose 50% Injectable 25 Gram(s) IV Push once  FLUoxetine 20 milliGRAM(s) Oral daily  gabapentin 400 milliGRAM(s) Oral three times a day  glucagon  Injectable 1 milliGRAM(s) IntraMuscular once  insulin lispro (ADMELOG) corrective regimen sliding scale   SubCutaneous three times a day before meals  insulin lispro (ADMELOG) corrective regimen sliding scale   SubCutaneous at bedtime  levothyroxine 75 MICROGram(s) Oral daily  losartan 100 milliGRAM(s) Oral daily  pantoprazole    Tablet 40 milliGRAM(s) Oral before breakfast  simvastatin 20 milliGRAM(s) Oral at bedtime    MEDICATIONS  (PRN):  acetaminophen     Tablet .. 650 milliGRAM(s) Oral every 6 hours PRN Temp greater or equal to 38C (100.4F), Moderate Pain (4 - 6)  dextrose Oral Gel 15 Gram(s) Oral once PRN Blood Glucose LESS THAN 70 milliGRAM(s)/deciliter  oxyCODONE    IR 5 milliGRAM(s) Oral every 6 hours PRN Severe Pain (7 - 10)      PAST MEDICAL & SURGICAL HISTORY:  HTN (hypertension)      HLD (hyperlipidemia)      Hypothyroidism      Major depression      DM (diabetes mellitus)      Sciatica      No significant past surgical history      [ ] Reviewed     SOCIAL HISTORY:  [x] Substance abuse:   [x] Tobacco:   [x] Alcohol use:     FAMILY HISTORY:  No pertinent family history in first degree relatives    [x] No pertinent family history in first degree relatives     REVIEW OF SYSTEMS:    [x] All other ROS negative  [  ] Unable to obtain due to poor mental status    Vital Signs Last 24 Hrs  T(C): 37.1 (30 Dec 2022 17:21), Max: 37.9 (29 Dec 2022 19:53)  T(F): 98.7 (30 Dec 2022 17:21), Max: 100.3 (29 Dec 2022 19:53)  HR: 71 (30 Dec 2022 17:21) (63 - 79)  BP: 120/93 (30 Dec 2022 17:21) (120/93 - 154/80)  BP(mean): --  RR: 18 (30 Dec 2022 17:21) (16 - 20)  SpO2: 98% (30 Dec 2022 17:21) (94% - 98%)    Parameters below as of 30 Dec 2022 17:21  Patient On (Oxygen Delivery Method): room air        PHYSICAL EXAM:    GENERAL: NAD, well-groomed, well-developed  HEAD:  Atraumatic, Normocephalic  EYES: EOMI, PERRLA, conjunctiva and sclera clear  ENMT: Moist mucous membranes  NECK: Supple, No JVD  RESPIRATORY: Clear to auscultation bilaterally; No rales, rhonchi, wheezing, or rubs  CARDIOVASCULAR: Regular rate and rhythm; No murmurs, rubs, or gallops  GASTROINTESTINAL: Soft, Nontender, Nondistended; Bowel sounds present  GENITOURINARY: Not examined  EXTREMITIES:  2+ Peripheral Pulses, No clubbing, cyanosis, or edema  NERVOUS SYSTEM:  Alert & Oriented X3; Moving all 4 extremities; No gross sensory deficits  HEME/LYMPH: No lymphadenopathy noted  SKIN: No rashes or lesions; Incisions C/D/I    LABS:                        9.9    8.64  )-----------( 292      ( 30 Dec 2022 07:15 )             31.0     12-30    141  |  104  |  26<H>  ----------------------------<  137<H>  4.7   |  29  |  1.00    Ca    9.9      30 Dec 2022 07:15  Phos  4.2     12-30  Mg     2.1     12-30    TPro  7.3  /  Alb  3.6  /  TBili  0.2  /  DBili  x   /  AST  14  /  ALT  16  /  AlkPhos  92  12-29        CAPILLARY BLOOD GLUCOSE      POCT Blood Glucose.: 109 mg/dL (30 Dec 2022 17:51)      RADIOLOGY & ADDITIONAL STUDIES:    EKG:   Personally Reviewed:  [x] YES     Imaging:   Personally Reviewed:  [x] YES               [ ] Consultant(s) Notes Reviewed  [x] Care Discussed with Consultants/Other Providers: Urology PA - discussed CHIEF COMPLAINT: Patient is a 77y old  Female who presents with a chief complaint of cervical, thoracic stenosis (30 Dec 2022 17:09)      HPI: HPI:  76 yo F PMH HTN, HLD, DM on metformin, hypothyroidism, depression, and sciatica presents with b/l lower extremity weakness. Pt reports she had a fall about 2 weeks ago and weakness began suddenly one week ago. She has been unable to get out of bed and walk at home. Pt states she has associated numbness, tingling, and pain 7/10 constant in her posterior thighs and lower legs. Pt took acetaminophen at home for pain which helped. Pt also endorses urinary incontinence and stool incontinence and chronic dysuria. Pt denies fever, chills, headache, CP, SOB, N/V/D, or constipation.    Does have lower back pain, non radicular    Patient also report to have ovarian cancer and had oopherectomy 2 years ago, unclear if chemo needed.     PMD is out of [place] Dr. Jorge L Hung from [place]  (30 Dec 2022 17:09)    Interval history: Pt had MRI at  which showed, severe spinal stenosis at L3-4, L4-5, degenerative changes and stenosis adjacent to prior ACDF at C2-T2, pt was transferred to [facility] for surgical eval under NSG.         Pain Symptoms if applicable: moderate back pack 6-7     Allergies    No Known Allergies    Intolerances        HOME MEDICATIONS: [x] Reviewed    MEDICATIONS  (STANDING):  baclofen 10 milliGRAM(s) Oral every 8 hours  dextrose 5%. 1000 milliLiter(s) (50 mL/Hr) IV Continuous <Continuous>  dextrose 5%. 1000 milliLiter(s) (100 mL/Hr) IV Continuous <Continuous>  dextrose 50% Injectable 25 Gram(s) IV Push once  dextrose 50% Injectable 12.5 Gram(s) IV Push once  dextrose 50% Injectable 25 Gram(s) IV Push once  FLUoxetine 20 milliGRAM(s) Oral daily  gabapentin 400 milliGRAM(s) Oral three times a day  glucagon  Injectable 1 milliGRAM(s) IntraMuscular once  insulin lispro (ADMELOG) corrective regimen sliding scale   SubCutaneous three times a day before meals  insulin lispro (ADMELOG) corrective regimen sliding scale   SubCutaneous at bedtime  levothyroxine 75 MICROGram(s) Oral daily  losartan 100 milliGRAM(s) Oral daily  pantoprazole    Tablet 40 milliGRAM(s) Oral before breakfast  simvastatin 20 milliGRAM(s) Oral at bedtime    MEDICATIONS  (PRN):  acetaminophen     Tablet .. 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      [x ] Reviewed     SOCIAL HISTORY:  [x] Substance abuse: denies  [x] Tobacco: denies  [x] Alcohol use: denies    FAMILY HISTORY:  No pertinent family history in first degree relatives    [x] No pertinent family history in first degree relatives     REVIEW OF SYSTEMS:    [x] All other ROS negative  [  ] Unable to obtain due to poor mental status    Vital Signs Last 24 Hrs  T(C): 37.1 (30 Dec 2022 17:21), Max: 37.9 (29 Dec 2022 19:53)  T(F): 98.7 (30 Dec 2022 17:21), Max: 100.3 (29 Dec 2022 19:53)  HR: 71 (30 Dec 2022 17:21) (63 - 79)  BP: 120/93 (30 Dec 2022 17:21) (120/93 - 154/80)  BP(mean): --  RR: 18 (30 Dec 2022 17:21) (16 - 20)  SpO2: 98% (30 Dec 2022 17:21) (94% - 98%)    Parameters below as of 30 Dec 2022 17:21  Patient On (Oxygen Delivery Method): room air        PHYSICAL EXAM:    GENERAL: NAD, well-appearing   HEAD:  Atraumatic, Normocephalic  EYES: EOMI, PERRLA, conjunctiva and sclera clear  ENMT: Moist mucous membranes  NECK: Supple, No JVD  RESPIRATORY: Clear to auscultation bilaterally; No rales, rhonchi, wheezing, or rubs  CARDIOVASCULAR: Regular rate and rhythm; No murmurs, rubs, or gallops  GASTROINTESTINAL: Soft, Nontender, Nondistended; Bowel sounds present  GENITOURINARY: Not examined  EXTREMITIES:  2+ Peripheral Pulses, No clubbing, cyanosis, or edema  NERVOUS SYSTEM:  Alert & Oriented X3; CN grossly intact, b/l lower extremity weakness   HEME/LYMPH: No lymphadenopathy noted  SKIN: No rashes or lesions    LABS:                        9.9    8.64  )-----------( 292      ( 30 Dec 2022 07:15 )             31.0     12-30    141  |  104  |  26<H>  ----------------------------<  137<H>  4.7   |  29  |  1.00    Ca    9.9      30 Dec 2022 07:15  Phos  4.2     12-30  Mg     2.1     12-30    TPro  7.3  /  Alb  3.6  /  TBili  0.2  /  DBili  x   /  AST  14  /  ALT  16  /  AlkPhos  92  12-29        CAPILLARY BLOOD GLUCOSE      POCT Blood Glucose.: 109 mg/dL (30 Dec 2022 17:51)      RADIOLOGY & ADDITIONAL STUDIES:    EKG: pending    Imaging:   < from: MR Thoracic Spine w/wo IV Cont (12.29.22 @ 13:07) >  IMPRESSION: No abnormal parenchymal or leptomeningeal enhancement.   Moderate degenerative changes C2-3 and T1 to the top C5 complexes and   ligamentous hypertrophy. Previous anterior cervical discectomy C5-6 and   C6-7. Multilevel thoracic disc osteophyte complexes largest at T8-9 and   T10-11. The T10-11 disc osteophyte abuts but does not compress the cord.    Transitional vertebra at the lumbosacral junction with equalization of L5   and a hypoplastic disc space L5-S1. Severe spinal stenosis L3-4 and L4-5   due to degenerative changes.    --- End of Report ---            LISA TURNER MD; Attending Radiologist  This document has been electronically signed. Dec 29 2022  4:08PM    < end of copied text >              [x ] Consultant(s) Notes Reviewed  [x] Care Discussed with Consultants/Other Providers: DANIEL PA - discussed

## 2022-12-30 NOTE — H&P ADULT - ASSESSMENT
78 yo F PMH HTN, HLD, DM on metformin, hypothyroidism, depression, and sciatica presents with b/l lower extremity weakness with MRI findings of severe spinal stenosis at L3-4, L4-5, degenerative changes and stenosis adjacent to prior ACDF    PLAN:  - CT C/T/L spine  - Medical/cardiac clearance for possible OR    Case discussed with attending neurosurgeon Dr. Cedeño 76 yo F PMH HTN, HLD, DM on metformin, hypothyroidism, depression, and sciatica presents with b/l lower extremity weakness with MRI findings of severe spinal stenosis at L3-4, L4-5, degenerative changes and stenosis adjacent to prior ACDF at C2-T2    PLAN:  - CT C/T/L spine  - Medical/cardiac clearance for possible OR    Case discussed with attending neurosurgeon Dr. Cedeño 78 yo F PMH HTN, HLD, DM on metformin, hypothyroidism, depression, and sciatica presents with b/l lower extremity weakness with MRI findings of severe spinal stenosis at L3-4, L4-5, degenerative changes and stenosis adjacent to prior ACDF at C2-T2    PLAN:  - CT C/T/L spine  - Medical/cardiac clearance for possible OR  - LE dopplers  - A1C    Case discussed with attending neurosurgeon Dr. Cedeño 78 yo F PMH HTN, HLD, DM on metformin, hypothyroidism, depression, and sciatica presents with b/l lower extremity weakness with MRI findings of severe spinal stenosis at L3-4, L4-5, degenerative changes and stenosis adjacent to prior ACDF at C2-T2    Surgical plan discussed at length with patient. Patient currently reluctant to surgery, however further discussions are to be had to again explain the risks of not proceeding forward with surgery.    PLAN:  - CT C/T/L spine  - Medical/cardiac clearance for possible OR  - LE dopplers  - A1C    Case discussed with attending neurosurgeon Dr. Cedeño

## 2022-12-30 NOTE — ACUTE INTERFACILITY TRANSFER NOTE - SECONDARY DIAGNOSIS.
Weakness of both lower extremities DM (diabetes mellitus) Sciatica HLD (hyperlipidemia) HTN (hypertension)

## 2022-12-30 NOTE — PATIENT PROFILE ADULT - FUNCTIONAL ASSESSMENT - BASIC MOBILITY 6.
1-calculated by average/Not able to assess (calculate score using [facility] averaging method)

## 2022-12-30 NOTE — PROGRESS NOTE ADULT - PROBLEM SELECTOR PLAN 1
- Pt presents with LE weakness b/l for one week along with chronic numbness, tingling, pain and fecal/urinary incontinence  - CTH Negative for acute infarction  - CT pelvis - Moderate pubic symphysis arthrosis with chondrocalcinosis and degenerative changes at L5-S1. No acute fracture  - no suspicion of acute cord compression based on timeline of symptoms or clinical exam  - no role for high dose steroids at this time as above and confirmed by Neuro   - MRI Thoracic Spine: hypoplastic disc space L5-S1. Severe spinal stenosis L3-4 and L4-5   due to degenerative changes.  - [facility] consulted   - PT consult  - Neuro Dr. Aguirre following

## 2022-12-30 NOTE — ACUTE INTERFACILITY TRANSFER NOTE - PLAN OF CARE
This is a  76 yo F from home (HHA7d/10hr) with pmhx  HTN, HLD, DM on metformin, hypothyroidism, depression, and sciatica who presents with b/l lower leg weakness. Admitted for ambulatory dysfunction. Patient presented with profound weakness in b/l lower extermities.  mri of lumbar region revealed severe spinal stenosis.

## 2022-12-30 NOTE — CHART NOTE - NSCHARTNOTEFT_GEN_A_CORE
This is in follow-up to my neurologic consultation note of yesterday.  Assessment was:  "Spastic paraparesis, nearly completely paraplegic.      Cannot exclude UE weakness (due to guarding of effort).      Four-limb hyperreflexia.    Polyarthralgia.    Osteoarthritis.      Cervical myelopathy.      R/O cervical spondylotic myelopathy; R/O spinal neoplasm.    Diabetic peripheral sensory neuropathy."        As recommended she underwent MR wo/w contrast of C-. T- and L-spine.  Per radiology report:     "Evaluation of the cervical spine is limited by motion artifact.    Evaluation of the cervical spine demonstrates a previous anterior   cervical discectomy C5-6 and C6-7 and grafts metallic plating. There are   the C5-C6 and C7 vertebral bodies. There is minimal degenerative   anterolisthesis of T1 on T2. Mild ventral and dorsal extradural defects  at the T1-2 level is identified to degenerative changes with moderate   spinal stenosis. There also appears to be ventral extradural defects at   the C2-3 and C3-4 levels with dorsal extradural defects with moderate   stenosis at C2-3 and mild stenosis at C3-4. The cervical cord   demonstrates no abnormal signal intensity.    Evaluation of the thoracic spine demonstrates a slightly prominent   thoracic kyphosis. There are ventral extradural defects at C4-5 through   T10-11 with the largest ventral extradural defects at T8-9 and T10-11 is   similar with disc osteophyte complexes. At T10-11 there disc osteophyte   abuts the ventral aspect of the cord without abnormal cord signal. After   contrast administration there is normal osseous and vascular enhancement.    Evaluation of the lumbar spine demonstrates 4 lumbar type vertebral   bodies with sacralization of L5. There is disc degenerative endplate   change at L3-4 and L4-5.    At T12-L1 there is mild degenerative facet changes with mildspinal   stenosis. At L1-2 mild degenerative facet changes are identified with   mild spinal stenosis. At L2-3 moderate degenerative facet changes are   identified with mild to moderate spinal stenosis. At L3-4 there is a disc   osteophyte complex that in combination with degenerative facet changes   yields severe spinal stenosis. At L4-5 there is also severe spinal   stenosis due to a combination of disc osteophyte and degenerative facet   changes. There is a small disc space at L5-S1 which is hypoplastic.    After contrast administration there is normal osseous and vascular   enhancement.    The conus terminates normally at the T12 level.    Paraspinal soft tissues are unremarkable.    IMPRESSION: No abnormal parenchymal or leptomeningeal enhancement.   Moderate degenerative changes C2-3 and T1 to the top C5 complexes and   ligamentous hypertrophy. Previous anterior cervical discectomy C5-6 and   C6-7. Multilevel thoracic disc osteophyte complexes largest at T8-9 and   T10-11. The T10-11 disc osteophyte abuts but does not compress the cord.    Transitional vertebra at the lumbosacral junction with equalization of L5   and a hypoplastic disc space L5-S1. Severe spinal stenosis L3-4 and L4-5   due to degenerative changes."       RECOMMENDATION    Patient requires an examination and assessment by an attending spine surgeon.  If one is not available today locally then she should be transferred to an appropriate facility.

## 2022-12-31 DIAGNOSIS — Z29.9 ENCOUNTER FOR PROPHYLACTIC MEASURES, UNSPECIFIED: ICD-10-CM

## 2022-12-31 DIAGNOSIS — M54.30 SCIATICA, UNSPECIFIED SIDE: ICD-10-CM

## 2022-12-31 DIAGNOSIS — G43.909 MIGRAINE, UNSPECIFIED, NOT INTRACTABLE, WITHOUT STATUS MIGRAINOSUS: ICD-10-CM

## 2022-12-31 DIAGNOSIS — Z01.818 ENCOUNTER FOR OTHER PREPROCEDURAL EXAMINATION: ICD-10-CM

## 2022-12-31 LAB
A1C WITH ESTIMATED AVERAGE GLUCOSE RESULT: 6.6 % — HIGH (ref 4–5.6)
CRP SERPL-MCNC: 12.8 MG/L — HIGH
ERYTHROCYTE [SEDIMENTATION RATE] IN BLOOD: 44 MM/HR — HIGH (ref 4–25)
ESTIMATED AVERAGE GLUCOSE: 143 — SIGNIFICANT CHANGE UP
GLUCOSE BLDC GLUCOMTR-MCNC: 112 MG/DL — HIGH (ref 70–99)
GLUCOSE BLDC GLUCOMTR-MCNC: 122 MG/DL — HIGH (ref 70–99)
GLUCOSE BLDC GLUCOMTR-MCNC: 216 MG/DL — HIGH (ref 70–99)

## 2022-12-31 PROCEDURE — 99232 SBSQ HOSP IP/OBS MODERATE 35: CPT

## 2022-12-31 PROCEDURE — 72128 CT CHEST SPINE W/O DYE: CPT | Mod: 26

## 2022-12-31 PROCEDURE — 99233 SBSQ HOSP IP/OBS HIGH 50: CPT

## 2022-12-31 PROCEDURE — 74177 CT ABD & PELVIS W/CONTRAST: CPT | Mod: 26

## 2022-12-31 PROCEDURE — 72131 CT LUMBAR SPINE W/O DYE: CPT | Mod: 26

## 2022-12-31 PROCEDURE — 71260 CT THORAX DX C+: CPT | Mod: 26

## 2022-12-31 PROCEDURE — 93970 EXTREMITY STUDY: CPT | Mod: 26

## 2022-12-31 PROCEDURE — 72125 CT NECK SPINE W/O DYE: CPT | Mod: 26

## 2022-12-31 RX ORDER — FLUOXETINE HCL 10 MG
20 CAPSULE ORAL ONCE
Refills: 0 | Status: COMPLETED | OUTPATIENT
Start: 2022-12-31 | End: 2022-12-31

## 2022-12-31 RX ORDER — ENOXAPARIN SODIUM 100 MG/ML
40 INJECTION SUBCUTANEOUS
Refills: 0 | Status: DISCONTINUED | OUTPATIENT
Start: 2022-12-31 | End: 2023-01-09

## 2022-12-31 RX ORDER — POLYETHYLENE GLYCOL 3350 17 G/17G
17 POWDER, FOR SOLUTION ORAL
Refills: 0 | Status: DISCONTINUED | OUTPATIENT
Start: 2022-12-31 | End: 2023-01-09

## 2022-12-31 RX ORDER — SENNA PLUS 8.6 MG/1
2 TABLET ORAL AT BEDTIME
Refills: 0 | Status: DISCONTINUED | OUTPATIENT
Start: 2022-12-31 | End: 2023-01-09

## 2022-12-31 RX ADMIN — SENNA PLUS 2 TABLET(S): 8.6 TABLET ORAL at 22:38

## 2022-12-31 RX ADMIN — POLYETHYLENE GLYCOL 3350 17 GRAM(S): 17 POWDER, FOR SOLUTION ORAL at 19:22

## 2022-12-31 RX ADMIN — Medication 10 MILLIGRAM(S): at 22:37

## 2022-12-31 RX ADMIN — Medication 4: at 11:40

## 2022-12-31 RX ADMIN — SIMVASTATIN 20 MILLIGRAM(S): 20 TABLET, FILM COATED ORAL at 22:37

## 2022-12-31 RX ADMIN — GABAPENTIN 400 MILLIGRAM(S): 400 CAPSULE ORAL at 22:37

## 2022-12-31 RX ADMIN — Medication 10 MILLIGRAM(S): at 05:06

## 2022-12-31 RX ADMIN — LOSARTAN POTASSIUM 100 MILLIGRAM(S): 100 TABLET, FILM COATED ORAL at 05:05

## 2022-12-31 RX ADMIN — Medication 20 MILLIGRAM(S): at 05:45

## 2022-12-31 RX ADMIN — OXYCODONE HYDROCHLORIDE 5 MILLIGRAM(S): 5 TABLET ORAL at 18:34

## 2022-12-31 RX ADMIN — ENOXAPARIN SODIUM 40 MILLIGRAM(S): 100 INJECTION SUBCUTANEOUS at 22:38

## 2022-12-31 RX ADMIN — OXYCODONE HYDROCHLORIDE 5 MILLIGRAM(S): 5 TABLET ORAL at 19:18

## 2022-12-31 RX ADMIN — Medication 10 MILLIGRAM(S): at 14:01

## 2022-12-31 RX ADMIN — PANTOPRAZOLE SODIUM 40 MILLIGRAM(S): 20 TABLET, DELAYED RELEASE ORAL at 05:06

## 2022-12-31 RX ADMIN — Medication 75 MICROGRAM(S): at 05:05

## 2022-12-31 RX ADMIN — GABAPENTIN 400 MILLIGRAM(S): 400 CAPSULE ORAL at 05:05

## 2022-12-31 RX ADMIN — GABAPENTIN 400 MILLIGRAM(S): 400 CAPSULE ORAL at 14:00

## 2022-12-31 NOTE — PROGRESS NOTE ADULT - SUBJECTIVE AND OBJECTIVE BOX
76 yo F PMH HTN, HLD, DM on metformin, hypothyroidism, depression, and sciatica presents with b/l lower extremity weakness. Pt reports she had a fall about 2 weeks ago and weakness began suddenly one week ago. She has been unable to get out of bed and walk at home. Pt states she has associated numbness, tingling, and pain 7/10 constant in her posterior thighs and lower legs. Pt took acetaminophen at home for pain which helped. Pt also endorses urinary incontinence and stool incontinence and chronic dysuria. Pt denies fever, chills, headache, CP, SOB, N/V/D, or constipation.  Does have lower back pain, non radicular  Patient also report to have ovarian cancer and had oopherectomy 2 years ago, unclear if chemo needed.   PMD is out of [place] Dr. Jorge L Hung from [place]     PAST MEDICAL & SURGICAL HISTORY:  HTN (hypertension)  HLD (hyperlipidemia)  Hypothyroidism  Major depression  DM (diabetes mellitus)  Sciatica  No significant past surgical history    No issues overnight    Vital Signs Last 24 Hrs  T(C): 36.9 (30 Dec 2022 22:30), Max: 37.1 (30 Dec 2022 17:21)  T(F): 98.5 (30 Dec 2022 22:30), Max: 98.7 (30 Dec 2022 17:21)  HR: 73 (30 Dec 2022 22:30) (63 - 79)  BP: 149/56 (30 Dec 2022 22:30) (120/93 - 154/80)  BP(mean): --  RR: 18 (30 Dec 2022 22:30) (16 - 18)  SpO2: 95% (30 Dec 2022 22:30) (94% - 98%)    Parameters below as of 30 Dec 2022 22:30  Patient On (Oxygen Delivery Method): room air    AAO X 3  PERRLA, EOMI  Face symmetric   Motor: Bilateral UE 5/5  RLE: HF 2/5, KE 3/5, DF/PF 0/5, EHL 1/5  LLE: HF 3-/5, KE 3/5, DF 2/5, PF 1/5  Bicep reflexes+, patellar and achilles reflexes 0  No clonus    MEDICATIONS  (STANDING):  baclofen 10 milliGRAM(s) Oral every 8 hours  dextrose 5%. 1000 milliLiter(s) (50 mL/Hr) IV Continuous <Continuous>  dextrose 5%. 1000 milliLiter(s) (100 mL/Hr) IV Continuous <Continuous>  dextrose 50% Injectable 25 Gram(s) IV Push once  dextrose 50% Injectable 12.5 Gram(s) IV Push once  dextrose 50% Injectable 25 Gram(s) IV Push once  FLUoxetine 20 milliGRAM(s) Oral daily  gabapentin 400 milliGRAM(s) Oral three times a day  glucagon  Injectable 1 milliGRAM(s) IntraMuscular once  insulin lispro (ADMELOG) corrective regimen sliding scale   SubCutaneous three times a day before meals  insulin lispro (ADMELOG) corrective regimen sliding scale   SubCutaneous at bedtime  levothyroxine 75 MICROGram(s) Oral daily  losartan 100 milliGRAM(s) Oral daily  pantoprazole    Tablet 40 milliGRAM(s) Oral before breakfast  simvastatin 20 milliGRAM(s) Oral at bedtime    MEDICATIONS  (PRN):  acetaminophen     Tablet .. 650 milliGRAM(s) Oral every 6 hours PRN Temp greater or equal to 38C (100.4F), Moderate Pain (4 - 6)  dextrose Oral Gel 15 Gram(s) Oral once PRN Blood Glucose LESS THAN 70 milliGRAM(s)/deciliter  melatonin 6 milliGRAM(s) Oral at bedtime PRN Sleep  oxyCODONE    IR 5 milliGRAM(s) Oral every 6 hours PRN Severe Pain (7 - 10)    < from: MR Thoracic Spine w/wo IV Cont (12.29.22 @ 13:07) >  There appears to be a transitional vertebrae at the lumbosacral junction   with sacralization of the L5 vertebral body. For counting purpose using   the cervical vertebraethere are 4 lumbar type vertebral bodies with the   last well-formed disc space at L4-5, the L5 vertebral body is fused to   the S1 vertebral body.      Evaluation of the cervical spine is limited by motion artifact.    Evaluation of the cervical spine demonstrates a previous anterior   cervical discectomy C5-6 and C6-7 and grafts metallic plating. There are   the C5-C6 and C7 vertebral bodies. There is minimal degenerative   anterolisthesis of T1 on T2. Mild ventral and dorsal extradural defects  at the T1-2 level is identified to degenerative changes with moderate   spinal stenosis. There also appears to be ventral extradural defects at   the C2-3 and C3-4 levels with dorsal extradural defects with moderate   stenosis at C2-3 and mild stenosis at C3-4. The cervical cord   demonstrates no abnormal signal intensity.    Evaluation of the thoracic spine demonstrates a slightly prominent   thoracic kyphosis. There are ventral extradural defects at C4-5 through   T10-11 with the largest ventral extradural defects at T8-9 and T10-11 is   similar with disc osteophyte complexes. At T10-11 there disc osteophyte   abuts the ventral aspect of the cord without abnormal cord signal. After   contrast administration there is normal osseous and vascular enhancement.    Evaluation of the lumbar spine demonstrates 4 lumbar type vertebral   bodies with sacralization of L5. There is disc degenerative endplate   change at L3-4 and L4-5.    At T12-L1 there is mild degenerative facet changes with mildspinal   stenosis. At L1-2 mild degenerative facet changes are identified with   mild spinal stenosis. At L2-3 moderate degenerative facet changes are   identified with mild to moderate spinal stenosis. At L3-4 there is a disc   osteophyte complex that in combination with degenerative facet changes   yields severe spinal stenosis. At L4-5 there is also severe spinal   stenosis due to a combination of disc osteophyte and degenerative facet   changes. There is a small disc space at L5-S1 which is hypoplastic.    After contrast administration there is normal osseous and vascular   enhancement.    The conus terminates normally at the T12 level.    Paraspinal soft tissues are unremarkable.    IMPRESSION: No abnormal parenchymal or leptomeningeal enhancement.   Moderate degenerative changes C2-3 and T1 to the top C5 complexes and   ligamentous hypertrophy. Previous anterior cervical discectomy C5-6 and   C6-7. Multilevel thoracic disc osteophyte complexes largest at T8-9 and   T10-11. The T10-11 disc osteophyte abuts but does not compress the cord.    Transitional vertebra at the lumbosacral junction with equalization of L5   and a hypoplastic disc space L5-S1. Severe spinal stenosis L3-4 and L4-5   due to degenerative changes.    < end of copied text >

## 2022-12-31 NOTE — PROGRESS NOTE ADULT - PROBLEM SELECTOR PLAN 7
-TSH mildly elevated  -c/w home dose of Synthroid 75mcg PO daily  -recommend to follow-up outpatient labs 4-6 weeks

## 2022-12-31 NOTE — PROGRESS NOTE ADULT - SUBJECTIVE AND OBJECTIVE BOX
CHIEF COMPLAINT: f/u medical optimization    SUBJECTIVE / OVERNIGHT EVENTS: Patient seen and examined. No acute events overnight. Pain well controlled and patient without any complaints.    MEDICATIONS  (STANDING):  baclofen 10 milliGRAM(s) Oral every 8 hours  dextrose 5%. 1000 milliLiter(s) (50 mL/Hr) IV Continuous <Continuous>  dextrose 5%. 1000 milliLiter(s) (100 mL/Hr) IV Continuous <Continuous>  dextrose 50% Injectable 25 Gram(s) IV Push once  dextrose 50% Injectable 12.5 Gram(s) IV Push once  dextrose 50% Injectable 25 Gram(s) IV Push once  FLUoxetine 20 milliGRAM(s) Oral daily  gabapentin 400 milliGRAM(s) Oral three times a day  glucagon  Injectable 1 milliGRAM(s) IntraMuscular once  insulin lispro (ADMELOG) corrective regimen sliding scale   SubCutaneous three times a day before meals  insulin lispro (ADMELOG) corrective regimen sliding scale   SubCutaneous at bedtime  levothyroxine 75 MICROGram(s) Oral daily  losartan 100 milliGRAM(s) Oral daily  pantoprazole    Tablet 40 milliGRAM(s) Oral before breakfast  simvastatin 20 milliGRAM(s) Oral at bedtime    MEDICATIONS  (PRN):  acetaminophen     Tablet .. 650 milliGRAM(s) Oral every 6 hours PRN Temp greater or equal to 38C (100.4F), Moderate Pain (4 - 6)  dextrose Oral Gel 15 Gram(s) Oral once PRN Blood Glucose LESS THAN 70 milliGRAM(s)/deciliter  melatonin 6 milliGRAM(s) Oral at bedtime PRN Sleep  oxyCODONE    IR 5 milliGRAM(s) Oral every 6 hours PRN Severe Pain (7 - 10)      VITALS:  T(F): 98.5 (12-31-22 @ 05:11), Max: 98.7 (12-30-22 @ 17:21)  HR: 65 (12-31-22 @ 05:11) (65 - 79)  BP: 136/55 (12-31-22 @ 05:11) (120/93 - 149/56)  RR: 18 (12-31-22 @ 05:11) (18 - 18)  SpO2: 96% (12-31-22 @ 05:11)  Wt(kg): --  Height (cm): 160 (19:53)  Weight (kg): 77 (19:53)  BMI (kg/m2): 30.1 (19:53)    PHYSICAL EXAM:  GENERAL: NAD, well-developed    CHEST/LUNG: Clear to auscultation bilaterally; No wheeze  HEART: Regular rate and rhythm; No murmurs, rubs, or gallops  ABDOMEN: Soft, Nontender, Nondistended; Bowel sounds present  EXTREMITIES:  2+ Peripheral Pulses, No clubbing, cyanosis, or edema  PSYCH: AAOx3  NEUROLOGY: CN grossly intact; Bilateral LE weakness  SKIN: No rashes or lesions    LABS:              9.9                  141  | 29   | 26           8.64  >-----------< 292     ------------------------< 137                   31.0                 4.7  | 104  | 1.00                                         Ca 9.9   Mg 2.1   Ph 4.2          INR: 1.06 ratio;    PT: 12.3 sec;    PTT: 25.8 sec<L>    PsG9o=3.6%    CAPILLARY BLOOD GLUCOSE  POCT Blood Glucose.: 112 mg/dL (31 Dec 2022 07:04)  POCT Blood Glucose.: 126 mg/dL (30 Dec 2022 22:18)  POCT Blood Glucose.: 109 mg/dL (30 Dec 2022 17:51)  POCT Blood Glucose.: 225 mg/dL (30 Dec 2022 11:36)    [ ] Consultant(s) Notes Reviewed:  [x] Care Discussed with Consultants/Other Providers: Surgical PA - discussed

## 2022-12-31 NOTE — PROGRESS NOTE ADULT - PROBLEM SELECTOR PLAN 1
-awaiting CXR read and EKG  -difficult to assess METs as patient has not been ambulating much since her fall 2 weeks ago -awaiting CXR read and EKG  -difficult to assess METs as patient has not been ambulating much since her fall 2 weeks ago  -will f/u LE dopplers given immobility  -awaiting surgery plan per neurosurgery and studies listed above for now

## 2023-01-01 ENCOUNTER — APPOINTMENT (OUTPATIENT)
Dept: NEUROSURGERY | Facility: HOSPITAL | Age: 78
End: 2023-01-01

## 2023-01-01 DIAGNOSIS — E87.1 HYPO-OSMOLALITY AND HYPONATREMIA: ICD-10-CM

## 2023-01-01 LAB
ANION GAP SERPL CALC-SCNC: 12 MMOL/L — SIGNIFICANT CHANGE UP (ref 7–14)
BLD GP AB SCN SERPL QL: NEGATIVE — SIGNIFICANT CHANGE UP
BUN SERPL-MCNC: 24 MG/DL — HIGH (ref 7–23)
CALCIUM SERPL-MCNC: 9.5 MG/DL — SIGNIFICANT CHANGE UP (ref 8.4–10.5)
CANCER AG125 SERPL-ACNC: 17 U/ML — SIGNIFICANT CHANGE UP
CANCER AG19-9 SERPL-ACNC: 18 U/ML — SIGNIFICANT CHANGE UP
CEA SERPL-MCNC: 35.4 NG/ML — HIGH (ref 1–3.8)
CHLORIDE SERPL-SCNC: 95 MMOL/L — LOW (ref 98–107)
CO2 SERPL-SCNC: 26 MMOL/L — SIGNIFICANT CHANGE UP (ref 22–31)
CREAT SERPL-MCNC: 0.91 MG/DL — SIGNIFICANT CHANGE UP (ref 0.5–1.3)
EGFR: 64 ML/MIN/1.73M2 — SIGNIFICANT CHANGE UP
GLUCOSE BLDC GLUCOMTR-MCNC: 138 MG/DL — HIGH (ref 70–99)
GLUCOSE BLDC GLUCOMTR-MCNC: 198 MG/DL — HIGH (ref 70–99)
GLUCOSE BLDC GLUCOMTR-MCNC: 225 MG/DL — HIGH (ref 70–99)
GLUCOSE SERPL-MCNC: 152 MG/DL — HIGH (ref 70–99)
HCT VFR BLD CALC: 31.8 % — LOW (ref 34.5–45)
HGB BLD-MCNC: 9.9 G/DL — LOW (ref 11.5–15.5)
MCHC RBC-ENTMCNC: 24.1 PG — LOW (ref 27–34)
MCHC RBC-ENTMCNC: 31.1 GM/DL — LOW (ref 32–36)
MCV RBC AUTO: 77.4 FL — LOW (ref 80–100)
NRBC # BLD: 0 /100 WBCS — SIGNIFICANT CHANGE UP (ref 0–0)
NRBC # FLD: 0 K/UL — SIGNIFICANT CHANGE UP (ref 0–0)
PLATELET # BLD AUTO: 295 K/UL — SIGNIFICANT CHANGE UP (ref 150–400)
POTASSIUM SERPL-MCNC: 4.7 MMOL/L — SIGNIFICANT CHANGE UP (ref 3.5–5.3)
POTASSIUM SERPL-SCNC: 4.7 MMOL/L — SIGNIFICANT CHANGE UP (ref 3.5–5.3)
RBC # BLD: 4.11 M/UL — SIGNIFICANT CHANGE UP (ref 3.8–5.2)
RBC # FLD: 15.5 % — HIGH (ref 10.3–14.5)
RH IG SCN BLD-IMP: POSITIVE — SIGNIFICANT CHANGE UP
SODIUM SERPL-SCNC: 133 MMOL/L — LOW (ref 135–145)
WBC # BLD: 9.33 K/UL — SIGNIFICANT CHANGE UP (ref 3.8–10.5)
WBC # FLD AUTO: 9.33 K/UL — SIGNIFICANT CHANGE UP (ref 3.8–10.5)

## 2023-01-01 PROCEDURE — 99233 SBSQ HOSP IP/OBS HIGH 50: CPT

## 2023-01-01 RX ORDER — LACTULOSE 10 G/15ML
10 SOLUTION ORAL
Refills: 0 | Status: DISCONTINUED | OUTPATIENT
Start: 2023-01-01 | End: 2023-01-09

## 2023-01-01 RX ADMIN — Medication 20 MILLIGRAM(S): at 05:13

## 2023-01-01 RX ADMIN — GABAPENTIN 400 MILLIGRAM(S): 400 CAPSULE ORAL at 13:38

## 2023-01-01 RX ADMIN — ENOXAPARIN SODIUM 40 MILLIGRAM(S): 100 INJECTION SUBCUTANEOUS at 21:33

## 2023-01-01 RX ADMIN — Medication 650 MILLIGRAM(S): at 20:00

## 2023-01-01 RX ADMIN — LACTULOSE 10 GRAM(S): 10 SOLUTION ORAL at 17:09

## 2023-01-01 RX ADMIN — Medication 1 ENEMA: at 12:54

## 2023-01-01 RX ADMIN — SENNA PLUS 2 TABLET(S): 8.6 TABLET ORAL at 21:33

## 2023-01-01 RX ADMIN — GABAPENTIN 400 MILLIGRAM(S): 400 CAPSULE ORAL at 21:33

## 2023-01-01 RX ADMIN — PANTOPRAZOLE SODIUM 40 MILLIGRAM(S): 20 TABLET, DELAYED RELEASE ORAL at 05:12

## 2023-01-01 RX ADMIN — Medication 650 MILLIGRAM(S): at 19:49

## 2023-01-01 RX ADMIN — OXYCODONE HYDROCHLORIDE 5 MILLIGRAM(S): 5 TABLET ORAL at 16:00

## 2023-01-01 RX ADMIN — Medication 10 MILLIGRAM(S): at 05:12

## 2023-01-01 RX ADMIN — Medication 4: at 11:24

## 2023-01-01 RX ADMIN — Medication 10 MILLIGRAM(S): at 21:34

## 2023-01-01 RX ADMIN — LACTULOSE 10 GRAM(S): 10 SOLUTION ORAL at 07:34

## 2023-01-01 RX ADMIN — POLYETHYLENE GLYCOL 3350 17 GRAM(S): 17 POWDER, FOR SOLUTION ORAL at 17:09

## 2023-01-01 RX ADMIN — LOSARTAN POTASSIUM 100 MILLIGRAM(S): 100 TABLET, FILM COATED ORAL at 05:12

## 2023-01-01 RX ADMIN — Medication 75 MICROGRAM(S): at 05:13

## 2023-01-01 RX ADMIN — POLYETHYLENE GLYCOL 3350 17 GRAM(S): 17 POWDER, FOR SOLUTION ORAL at 05:15

## 2023-01-01 RX ADMIN — SIMVASTATIN 20 MILLIGRAM(S): 20 TABLET, FILM COATED ORAL at 21:33

## 2023-01-01 RX ADMIN — Medication 10 MILLIGRAM(S): at 13:38

## 2023-01-01 RX ADMIN — GABAPENTIN 400 MILLIGRAM(S): 400 CAPSULE ORAL at 05:12

## 2023-01-01 RX ADMIN — OXYCODONE HYDROCHLORIDE 5 MILLIGRAM(S): 5 TABLET ORAL at 15:16

## 2023-01-01 RX ADMIN — Medication 2: at 16:56

## 2023-01-01 NOTE — PATIENT PROFILE ADULT - FUNCTIONAL ASSESSMENT - BASIC MOBILITY SCORE.
Sunshine Shankar  [facility], [address]  [place] [address]  Phone: [phone]  Fax: (   )    -  Follow Up Time: 1-3 days   7

## 2023-01-01 NOTE — PROGRESS NOTE ADULT - PROBLEM SELECTOR PLAN 1
-CXR and EKG reviewed   -difficult to assess METs as patient has not been ambulating much since her fall 2 weeks ago  -LE dopplers negative for DVT  -awaiting surgery plan per neurosurgery -- per neurosurgery patient's family member reports colon cancer and poor baseline status not ambulating much for the past 2 years. Will f/u CT Chest, Abd and Pelvis and f/u conversations with family per neurosurgery  -surgery currently on hold  -awaiting more collateral information for  pre-op eval

## 2023-01-01 NOTE — PROGRESS NOTE ADULT - PROBLEM SELECTOR PLAN 7
-NkI6y=6.6%. FS well controlled. Continue with ISS for now and continue to monitor FS closely.  -holding home metformin dose

## 2023-01-01 NOTE — PROGRESS NOTE ADULT - SUBJECTIVE AND OBJECTIVE BOX
Patient seen and examined at bedside.    --Anticoagulation--  enoxaparin Injectable 40 milliGRAM(s) SubCutaneous <User Schedule>    T(C): 36.8 (01-01-23 @ 01:41), Max: 37.4 (12-31-22 @ 17:44)  HR: 77 (01-01-23 @ 01:41) (65 - 79)  BP: 154/70 (01-01-23 @ 01:41) (124/61 - 154/70)  RR: 18 (01-01-23 @ 01:41) (17 - 18)  SpO2: 97% (01-01-23 @ 01:41) (95% - 97%)  Wt(kg): --    Exam:  AOx3, PERRL, EOMI, no facial, BUE 5/5  RLE: HF 2/5, KE 3/5, DF/PF 0/5, EHL 1/5  LLE: HF 3-/5, KE 3/5, DF 2/5, PF 1/5  No clonus, SILT    Labs:                        9.9    8.64  )-----------( 292      ( 30 Dec 2022 07:15 )             31.0     12-30    141  |  104  |  26<H>  ----------------------------<  137<H>  4.7   |  29  |  1.00    Ca    9.9      30 Dec 2022 07:15  Phos  4.2     12-30  Mg     2.1     12-30

## 2023-01-01 NOTE — PROGRESS NOTE ADULT - SUBJECTIVE AND OBJECTIVE BOX
CHIEF COMPLAINT: f/u medical optimization    SUBJECTIVE / OVERNIGHT EVENTS: Patient seen and examined. No acute events overnight. Pain well controlled and patient without any complaints.    MEDICATIONS  (STANDING):  baclofen 10 milliGRAM(s) Oral every 8 hours  dextrose 5%. 1000 milliLiter(s) (50 mL/Hr) IV Continuous <Continuous>  dextrose 5%. 1000 milliLiter(s) (100 mL/Hr) IV Continuous <Continuous>  dextrose 50% Injectable 25 Gram(s) IV Push once  dextrose 50% Injectable 12.5 Gram(s) IV Push once  dextrose 50% Injectable 25 Gram(s) IV Push once  enoxaparin Injectable 40 milliGRAM(s) SubCutaneous <User Schedule>  FLUoxetine 20 milliGRAM(s) Oral daily  gabapentin 400 milliGRAM(s) Oral three times a day  glucagon  Injectable 1 milliGRAM(s) IntraMuscular once  insulin lispro (ADMELOG) corrective regimen sliding scale   SubCutaneous three times a day before meals  insulin lispro (ADMELOG) corrective regimen sliding scale   SubCutaneous at bedtime  lactulose Syrup 10 Gram(s) Oral two times a day  levothyroxine 75 MICROGram(s) Oral daily  losartan 100 milliGRAM(s) Oral daily  pantoprazole    Tablet 40 milliGRAM(s) Oral before breakfast  polyethylene glycol 3350 17 Gram(s) Oral two times a day  saline laxative (FLEET) Rectal Enema 1 Enema Rectal daily  senna 2 Tablet(s) Oral at bedtime  simvastatin 20 milliGRAM(s) Oral at bedtime    MEDICATIONS  (PRN):  acetaminophen     Tablet .. 650 milliGRAM(s) Oral every 6 hours PRN Temp greater or equal to 38C (100.4F), Moderate Pain (4 - 6)  bisacodyl Suppository 10 milliGRAM(s) Rectal daily PRN Constipation  dextrose Oral Gel 15 Gram(s) Oral once PRN Blood Glucose LESS THAN 70 milliGRAM(s)/deciliter  melatonin 6 milliGRAM(s) Oral at bedtime PRN Sleep  oxyCODONE    IR 5 milliGRAM(s) Oral every 6 hours PRN Severe Pain (7 - 10)      VITALS:  T(F): 98.8 (01-01-23 @ 09:36), Max: 99.3 (12-31-22 @ 17:44)  HR: 62 (01-01-23 @ 09:36) (62 - 79)  BP: 132/69 (01-01-23 @ 09:36) (124/61 - 154/70)  RR: 17 (01-01-23 @ 09:36) (16 - 18)  SpO2: 96% (01-01-23 @ 09:36)    PHYSICAL EXAM:  GENERAL: NAD, well-developed  CHEST/LUNG: Clear to auscultation bilaterally; No wheeze  HEART: Regular rate and rhythm; No murmurs, rubs, or gallops  ABDOMEN: Soft, Nontender, Nondistended; Bowel sounds present  EXTREMITIES:  2+ Peripheral Pulses, No clubbing, cyanosis, or edema  PSYCH: AAOx3  NEUROLOGY: CN grossly intact; Bilateral LE weakness  SKIN: No rashes or lesions    LABS:              9.9                  133  | 26   | 24           9.33  >-----------< 295     ------------------------< 152                   31.8                 4.7  | 95   | 0.91                                         Ca 9.5   Mg x     Ph x        INR: 1.06 ratio;    PT: 12.3 sec;    PTT: 25.8 sec<L>    ESR=44 and CRP=12.8    RADIOLOGY & ADDITIONAL TESTS:  Imaging Personally Reviewed: [x] Yes  < from: US Duplex Venous Lower Ext Complete, Bilateral (12.31.22 @ 15:15) >  IMPRESSION: No evidence of deep venous thrombosis in either lower extremity.  < end of copied text >    EKG - NSR with HR 71    [ ] Consultant(s) Notes Reviewed:  [x] Care Discussed with Consultants/Other Providers: Neurosurgery Attending - discussed holding surgery until GOC addressed with family and supposed malignancy work-up completed

## 2023-01-02 LAB
GLUCOSE BLDC GLUCOMTR-MCNC: 149 MG/DL — HIGH (ref 70–99)
GLUCOSE BLDC GLUCOMTR-MCNC: 181 MG/DL — HIGH (ref 70–99)
GLUCOSE BLDC GLUCOMTR-MCNC: 209 MG/DL — HIGH (ref 70–99)
GLUCOSE BLDC GLUCOMTR-MCNC: 278 MG/DL — HIGH (ref 70–99)

## 2023-01-02 PROCEDURE — 99232 SBSQ HOSP IP/OBS MODERATE 35: CPT

## 2023-01-02 PROCEDURE — 99222 1ST HOSP IP/OBS MODERATE 55: CPT | Mod: GC

## 2023-01-02 RX ADMIN — Medication 10 MILLIGRAM(S): at 13:23

## 2023-01-02 RX ADMIN — GABAPENTIN 400 MILLIGRAM(S): 400 CAPSULE ORAL at 21:36

## 2023-01-02 RX ADMIN — OXYCODONE HYDROCHLORIDE 5 MILLIGRAM(S): 5 TABLET ORAL at 16:23

## 2023-01-02 RX ADMIN — GABAPENTIN 400 MILLIGRAM(S): 400 CAPSULE ORAL at 06:03

## 2023-01-02 RX ADMIN — Medication 10 MILLIGRAM(S): at 06:03

## 2023-01-02 RX ADMIN — Medication 4: at 16:39

## 2023-01-02 RX ADMIN — OXYCODONE HYDROCHLORIDE 5 MILLIGRAM(S): 5 TABLET ORAL at 17:06

## 2023-01-02 RX ADMIN — Medication 20 MILLIGRAM(S): at 06:03

## 2023-01-02 RX ADMIN — ENOXAPARIN SODIUM 40 MILLIGRAM(S): 100 INJECTION SUBCUTANEOUS at 21:36

## 2023-01-02 RX ADMIN — GABAPENTIN 400 MILLIGRAM(S): 400 CAPSULE ORAL at 13:23

## 2023-01-02 RX ADMIN — SENNA PLUS 2 TABLET(S): 8.6 TABLET ORAL at 21:36

## 2023-01-02 RX ADMIN — Medication 6: at 11:27

## 2023-01-02 RX ADMIN — LOSARTAN POTASSIUM 100 MILLIGRAM(S): 100 TABLET, FILM COATED ORAL at 06:02

## 2023-01-02 RX ADMIN — PANTOPRAZOLE SODIUM 40 MILLIGRAM(S): 20 TABLET, DELAYED RELEASE ORAL at 06:02

## 2023-01-02 RX ADMIN — SIMVASTATIN 20 MILLIGRAM(S): 20 TABLET, FILM COATED ORAL at 21:36

## 2023-01-02 RX ADMIN — Medication 10 MILLIGRAM(S): at 21:36

## 2023-01-02 RX ADMIN — Medication 75 MICROGRAM(S): at 06:03

## 2023-01-02 NOTE — PHYSICAL THERAPY INITIAL EVALUATION ADULT - DIAGNOSIS, PT EVAL
Upon evaluation, pt presents with impairments in functional mobility, strength, active ROM bilateral LE, and balance. Pt would benefit from skilled PT services in the acute care setting to address impairments to facilitate return to prior level of function.

## 2023-01-02 NOTE — PROGRESS NOTE ADULT - SUBJECTIVE AND OBJECTIVE BOX
78 yo F PMH HTN, HLD, DM on metformin, hypothyroidism, depression, and sciatica presents with b/l lower extremity weakness. Pt reports she had a fall about 2 weeks ago and weakness began suddenly one week ago. She has been unable to get out of bed and walk at home. Pt states she has associated numbness, tingling, and pain 7/10 constant in her posterior thighs and lower legs. Pt took acetaminophen at home for pain which helped. Pt also endorses urinary incontinence and stool incontinence and chronic dysuria. Pt denies fever, chills, headache, CP, SOB, N/V/D, or constipation.  Does have lower back pain, non radicular  Patient also report to have ovarian cancer and had oopherectomy 2 years ago, unclear if chemo needed.   PMD is out of [place] Dr. Jorge L Hung from [place]     PAST MEDICAL & SURGICAL HISTORY:  HTN (hypertension)  HLD (hyperlipidemia)  Hypothyroidism  Major depression  DM (diabetes mellitus)  Sciatica  No significant past surgical history    No issues overnight  Vital Signs Last 24 Hrs  T(C): 36.8 (01 Jan 2023 21:44), Max: 37.1 (01 Jan 2023 09:36)  T(F): 98.2 (01 Jan 2023 21:44), Max: 98.8 (01 Jan 2023 09:36)  HR: 70 (01 Jan 2023 21:44) (62 - 86)  BP: 147/63 (01 Jan 2023 21:44) (121/66 - 154/70)  BP(mean): --  RR: 17 (01 Jan 2023 21:44) (16 - 18)  SpO2: 96% (01 Jan 2023 21:44) (95% - 97%)    Parameters below as of 01 Jan 2023 21:44  Patient On (Oxygen Delivery Method): room air    AAO X 3  PERRLA, EOMI  Face symmetric   Motor: Bilateral UE 5/5  RLE: HF 2/5, KE 3/5, DF/PF 0/5, EHL 1/5  LLE: HF 3-/5, KE 3/5, DF 2/5, PF 1/5  Bicep reflexes+, patellar and achilles reflexes 0  No clonus    MEDICATIONS  (STANDING):  baclofen 10 milliGRAM(s) Oral every 8 hours  dextrose 5%. 1000 milliLiter(s) (50 mL/Hr) IV Continuous <Continuous>  dextrose 5%. 1000 milliLiter(s) (100 mL/Hr) IV Continuous <Continuous>  dextrose 50% Injectable 25 Gram(s) IV Push once  dextrose 50% Injectable 12.5 Gram(s) IV Push once  dextrose 50% Injectable 25 Gram(s) IV Push once  enoxaparin Injectable 40 milliGRAM(s) SubCutaneous <User Schedule>  FLUoxetine 20 milliGRAM(s) Oral daily  gabapentin 400 milliGRAM(s) Oral three times a day  glucagon  Injectable 1 milliGRAM(s) IntraMuscular once  insulin lispro (ADMELOG) corrective regimen sliding scale   SubCutaneous three times a day before meals  insulin lispro (ADMELOG) corrective regimen sliding scale   SubCutaneous at bedtime  lactulose Syrup 10 Gram(s) Oral two times a day  levothyroxine 75 MICROGram(s) Oral daily  losartan 100 milliGRAM(s) Oral daily  pantoprazole    Tablet 40 milliGRAM(s) Oral before breakfast  polyethylene glycol 3350 17 Gram(s) Oral two times a day  saline laxative (FLEET) Rectal Enema 1 Enema Rectal daily  senna 2 Tablet(s) Oral at bedtime  simvastatin 20 milliGRAM(s) Oral at bedtime    MEDICATIONS  (PRN):  acetaminophen     Tablet .. 650 milliGRAM(s) Oral every 6 hours PRN Temp greater or equal to 38C (100.4F), Moderate Pain (4 - 6)  bisacodyl Suppository 10 milliGRAM(s) Rectal daily PRN Constipation  dextrose Oral Gel 15 Gram(s) Oral once PRN Blood Glucose LESS THAN 70 milliGRAM(s)/deciliter  melatonin 6 milliGRAM(s) Oral at bedtime PRN Sleep  oxyCODONE    IR 5 milliGRAM(s) Oral every 6 hours PRN Severe Pain (7 - 10)

## 2023-01-02 NOTE — PROGRESS NOTE ADULT - PROBLEM SELECTOR PLAN 7
-CzC2f=6.6%. FS well controlled. Continue with ISS for now and continue to monitor FS closely.  -holding home metformin dose

## 2023-01-02 NOTE — PROGRESS NOTE ADULT - SUBJECTIVE AND OBJECTIVE BOX
CHIEF COMPLAINT: f/u medical optimization    SUBJECTIVE / OVERNIGHT EVENTS: Patient seen and examined. No acute events overnight. Pain well controlled and patient without any complaints.    MEDICATIONS  (STANDING):  baclofen 10 milliGRAM(s) Oral every 8 hours  dextrose 5%. 1000 milliLiter(s) (50 mL/Hr) IV Continuous <Continuous>  dextrose 5%. 1000 milliLiter(s) (100 mL/Hr) IV Continuous <Continuous>  dextrose 50% Injectable 25 Gram(s) IV Push once  dextrose 50% Injectable 12.5 Gram(s) IV Push once  dextrose 50% Injectable 25 Gram(s) IV Push once  enoxaparin Injectable 40 milliGRAM(s) SubCutaneous <User Schedule>  FLUoxetine 20 milliGRAM(s) Oral daily  gabapentin 400 milliGRAM(s) Oral three times a day  glucagon  Injectable 1 milliGRAM(s) IntraMuscular once  insulin lispro (ADMELOG) corrective regimen sliding scale   SubCutaneous three times a day before meals  insulin lispro (ADMELOG) corrective regimen sliding scale   SubCutaneous at bedtime  lactulose Syrup 10 Gram(s) Oral two times a day  levothyroxine 75 MICROGram(s) Oral daily  losartan 100 milliGRAM(s) Oral daily  pantoprazole    Tablet 40 milliGRAM(s) Oral before breakfast  polyethylene glycol 3350 17 Gram(s) Oral two times a day  saline laxative (FLEET) Rectal Enema 1 Enema Rectal daily  senna 2 Tablet(s) Oral at bedtime  simvastatin 20 milliGRAM(s) Oral at bedtime    MEDICATIONS  (PRN):  acetaminophen     Tablet .. 650 milliGRAM(s) Oral every 6 hours PRN Temp greater or equal to 38C (100.4F), Moderate Pain (4 - 6)  bisacodyl Suppository 10 milliGRAM(s) Rectal daily PRN Constipation  dextrose Oral Gel 15 Gram(s) Oral once PRN Blood Glucose LESS THAN 70 milliGRAM(s)/deciliter  melatonin 6 milliGRAM(s) Oral at bedtime PRN Sleep  oxyCODONE    IR 5 milliGRAM(s) Oral every 6 hours PRN Severe Pain (7 - 10)      VITALS:  T(F): 98.2 (01-02-23 @ 09:23), Max: 98.3 (01-02-23 @ 01:29)  HR: 64 (01-02-23 @ 06:00) (64 - 86)  BP: 119/59 (01-02-23 @ 09:23) (119/59 - 147/63)  RR: 17 (01-02-23 @ 09:23) (16 - 18)  SpO2: 100% (01-02-23 @ 09:23)    PHYSICAL EXAM:  GENERAL: NAD, well-developed  CHEST/LUNG: Clear to auscultation bilaterally; No wheeze  HEART: Regular rate and rhythm; No murmurs, rubs, or gallops  ABDOMEN: Soft, Nontender, Nondistended; Bowel sounds present  EXTREMITIES:  2+ Peripheral Pulses, No clubbing, cyanosis, or edema  PSYCH: AAOx3  NEUROLOGY: CN grossly intact; Bilateral LE weakness  SKIN: No rashes or lesions    LABS:              9.9                  133  | 26   | 24           9.33  >-----------< 295     ------------------------< 152                   31.8                 4.7  | 95   | 0.91                                         Ca 9.5   Mg x     Ph x        CEA = 35.4  CA-125 = 15  Ca 19-9 = 18    CAPILLARY BLOOD GLUCOSE  POCT Blood Glucose.: 149 mg/dL (02 Jan 2023 07:01)  POCT Blood Glucose.: 198 mg/dL (01 Jan 2023 22:09)  POCT Blood Glucose.: 225 mg/dL (01 Jan 2023 10:54)    RADIOLOGY & ADDITIONAL TESTS:  Imaging Personally Reviewed: [x] Yes  < from: CT Lumbar Spine No Cont (12.31.22 @ 21:54) >  IMPRESSION:    1. CERVICAL:   Anterior stabilization at C5-C7 appears intact. Reversal   of the normal cervical lordosis suggestive of disc pathology and / or   muscle spasm. Moderate mid cervical degenerative disc disease. C1-C2   osteoarthritis    2. THORACIC:   Kyphotic deformity. Idiopathic dextroscoliosis. Moderate   degenerative changes in the lower thoracic spine. Degenerative anterior   listhesis T1 on T2 results in at least mild central canal stenosis.    3. LUMBAR: Advanced degenerative disc disease L3-L4 and L4-L5 results in   high-grade central canal and foraminal stenosis. 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    4. No significant changes when compared toMRI of the spine from   12/29/2022. Please see MR spine regarding disc pathology and canal   structures, better demonstrated by the MR technique  < end of copied text >    < from: CT Abdomen and Pelvis w/ IV Cont (12.31.22 @ 21:55) >  IMPRESSION:  Questionable area of wall thickening versus peristalsis in the   rectosigmoid colon. Correlate with known clinical/colonoscopy history of   colon cancer.  No evidence of metastatic disease.  < end of copied text >    [ ] Consultant(s) Notes Reviewed:  [x] Care Discussed with Consultants/Other Providers: Surgical PA - discussed f/u heme-onc recs

## 2023-01-02 NOTE — PROGRESS NOTE ADULT - PROBLEM SELECTOR PLAN 1
-CXR and EKG reviewed   -difficult to assess METs as patient has not been ambulating much since her fall and much for the past 2 years  -recommend obtaining 2D Echo   -LE dopplers negative for DVT  -awaiting surgery plan per neurosurgery -- per neurosurgery patient's family member reports colon cancer and poor baseline status not ambulating much for the past 2 years. Patient was tentatively planned for OR, but case was cancelled after discussion between NSx and Daughter who reports PMHx of progressive ?colon ca.   -CT Chest, Abd and Pelvis reviewed  -f/u Heme-Onc recs   -awaiting surgery recs

## 2023-01-02 NOTE — PHYSICAL THERAPY INITIAL EVALUATION ADULT - PATIENT PROFILE REVIEW, REHAB EVAL
PT initial evaluation received and chart review completed. Pt agreeable to participate in PT evaluation. Pt cleared by RN Bethany and Neurosurgery Team (Maggy)/yes

## 2023-01-02 NOTE — PHYSICAL THERAPY INITIAL EVALUATION ADULT - MANUAL MUSCLE TESTING RESULTS, REHAB EVAL
Right UE grossly 3-/5; Left UE grossly 3/5; Right ankle DF/PF 0/5; Left ankle DF/PF 1/5; Bilateral quadriceps 3+/5

## 2023-01-02 NOTE — PHYSICAL THERAPY INITIAL EVALUATION ADULT - ADDITIONAL COMMENTS
Pt lives in an apartment with a roommate; there are 3 steps to enter. Has home health aide 10hrs/day. Owns a rolling walker, rollator, commode, shower chair, and wheelchair. Per patient, she was unable to negotiate stairs and was carried. Ambulated minimal distances.

## 2023-01-02 NOTE — PHYSICAL THERAPY INITIAL EVALUATION ADULT - PERTINENT HX OF CURRENT PROBLEM, REHAB EVAL
Pt is a 77 year old female presenting with bilateral LE weakness s/p fall about 2 weeks ago associated with numbness, tingling, pain in her posterior thighs and lower legs, and urinary and fecal incontinence. MRI lumbar and thoracic significant for moderate degenerative changes in cervical and thoracic spine and ligamentous hypertrophy, previous anterior cervical discectomy C5-6 and C6-7, multilevel thoracic disc osteophyte complexes, and hypoplastic disc space L5-S1 and severe spinal stenosis L3-4 and L4-5. Awaiting surgical plan and scheduling.

## 2023-01-02 NOTE — PHYSICAL THERAPY INITIAL EVALUATION ADULT - IMPAIRMENTS CONTRIBUTING IMPAIRED BED MOBILITY, REHAB EVAL
impaired balance/impaired coordination/decreased flexibility/impaired motor control/pain/impaired postural control/decreased ROM/decreased strength

## 2023-01-02 NOTE — PHYSICAL THERAPY INITIAL EVALUATION ADULT - ACTIVE RANGE OF MOTION EXAMINATION, REHAB EVAL
bilateral ankle ROM impaired; bilateral knee flexion ~90 degrees; left shoulder flexion ~80-90 degrees/Right UE Active ROM was WNL (within normal limits)/deficits as listed below

## 2023-01-02 NOTE — PHYSICAL THERAPY INITIAL EVALUATION ADULT - BALANCE DISTURBANCE, IDENTIFIED IMPAIRMENT CONTRIBUTE, REHAB EVAL
impaired coordination/impaired motor control/impaired postural control/decreased ROM/impaired sensory feedback/decreased strength

## 2023-01-02 NOTE — CONSULT NOTE ADULT - SUBJECTIVE AND OBJECTIVE BOX
Hematology Consult Note    HPI:  76 yo F PMH HTN, HLD, DM on metformin, hypothyroidism, depression, and sciatica presents with b/l lower extremity weakness. Pt reports she had a fall about 2 weeks ago and weakness began suddenly one week ago. She has been unable to get out of bed and walk at home. Pt states she has associated numbness, tingling, and pain 7/10 constant in her posterior thighs and lower legs. Pt took acetaminophen at home for pain which helped. Pt also endorses urinary incontinence and stool incontinence and chronic dysuria. Pt denies fever, chills, headache, CP, SOB, N/V/D, or constipation. Patient initially seen at [facility] and transfered here when found to have MRI findings of severe spinal stenosis at L3-4, L4-5, degenerative changes and stenosis adjacent to prior ACDF at C2-T2. Neurosurgery recommending surgery, however family is undecided given CT A/P findings or colon wall thickening and elevated CEA.     Patient also report to have ovarian cancer and had oopherectomy 2 years ago, unclear if chemo needed.     Oncology consulted for further management         PAST MEDICAL & SURGICAL HISTORY:  HTN (hypertension)      HLD (hyperlipidemia)      Hypothyroidism      Major depression      DM (diabetes mellitus)      Sciatica      No significant past surgical history          FAMILY HISTORY:  No pertinent family history in first degree relatives        MEDICATIONS  (STANDING):  baclofen 10 milliGRAM(s) Oral every 8 hours  dextrose 5%. 1000 milliLiter(s) (50 mL/Hr) IV Continuous <Continuous>  dextrose 5%. 1000 milliLiter(s) (100 mL/Hr) IV Continuous <Continuous>  dextrose 50% Injectable 25 Gram(s) IV Push once  dextrose 50% Injectable 12.5 Gram(s) IV Push once  dextrose 50% Injectable 25 Gram(s) IV Push once  enoxaparin Injectable 40 milliGRAM(s) SubCutaneous <User Schedule>  FLUoxetine 20 milliGRAM(s) Oral daily  gabapentin 400 milliGRAM(s) Oral three times a day  glucagon  Injectable 1 milliGRAM(s) IntraMuscular once  insulin lispro (ADMELOG) corrective regimen sliding scale   SubCutaneous three times a day before meals  insulin lispro (ADMELOG) corrective regimen sliding scale   SubCutaneous at bedtime  lactulose Syrup 10 Gram(s) Oral two times a day  levothyroxine 75 MICROGram(s) Oral daily  losartan 100 milliGRAM(s) Oral daily  pantoprazole    Tablet 40 milliGRAM(s) Oral before breakfast  polyethylene glycol 3350 17 Gram(s) Oral two times a day  saline laxative (FLEET) Rectal Enema 1 Enema Rectal daily  senna 2 Tablet(s) Oral at bedtime  simvastatin 20 milliGRAM(s) Oral at bedtime    MEDICATIONS  (PRN):  acetaminophen     Tablet .. 650 milliGRAM(s) Oral every 6 hours PRN Temp greater or equal to 38C (100.4F), Moderate Pain (4 - 6)  bisacodyl Suppository 10 milliGRAM(s) Rectal daily PRN Constipation  dextrose Oral Gel 15 Gram(s) Oral once PRN Blood Glucose LESS THAN 70 milliGRAM(s)/deciliter  melatonin 6 milliGRAM(s) Oral at bedtime PRN Sleep  oxyCODONE    IR 5 milliGRAM(s) Oral every 6 hours PRN Severe Pain (7 - 10)      Allergies    penicillins (Rash)    Intolerances        SOCIAL HISTORY: No EtOH, no tobacco        T(F): 98.3 (01-02-23 @ 06:00), Max: 98.8 (01-01-23 @ 09:36)  HR: 64 (01-02-23 @ 06:00)  BP: 123/56 (01-02-23 @ 06:00)  RR: 18 (01-02-23 @ 06:00)  SpO2: 96% (01-02-23 @ 06:00)  Wt(kg): --    GENERAL: NAD, well-developed  HEAD:  Atraumatic, Normocephalic  EYES: EOMI, PERRLA, conjunctiva and sclera clear  NECK: Supple, No JVD  CHEST/LUNG: Clear to auscultation bilaterally; No wheeze  HEART: Regular rate and rhythm; No murmurs, rubs, or gallops  ABDOMEN: Soft, Nontender, Nondistended; Bowel sounds present  EXTREMITIES:  2+ Peripheral Pulses, No clubbing, cyanosis, or edema  NEUROLOGY: non-focal  SKIN: No rashes or lesions                          9.9    9.33  )-----------( 295      ( 01 Jan 2023 05:58 )             31.8       01-01    133<L>  |  95<L>  |  24<H>  ----------------------------<  152<H>  4.7   |  26  |  0.91    Ca    9.5      01 Jan 2023 05:58            Vitamin B12, Serum: 459 pg/mL (12-30-22 @ 07:15)  Folate, Serum: 6.2 ng/mL (12-30-22 @ 07:15)  Homocysteine, Serum: 17.3 umol/L (12-30-22 @ 07:15)   Hematology Consult Note    HPI:  78 yo F PMH HTN, HLD, DM on metformin, hypothyroidism, depression, and sciatica presents with b/l lower extremity weakness. Pt reports she had a fall about 2 weeks ago and weakness began suddenly one week ago. She has been unable to get out of bed and walk at home. Pt states she has associated numbness, tingling, and pain 7/10 constant in her posterior thighs and lower legs. Pt took acetaminophen at home for pain which helped. Pt also endorses urinary incontinence and stool incontinence and chronic dysuria. Pt denies fever, chills, headache, CP, SOB, N/V/D, or constipation. Patient initially seen at [facility] and transfered here when found to have MRI findings of severe spinal stenosis at L3-4, L4-5, degenerative changes and stenosis adjacent to prior ACDF at C2-T2. Neurosurgery recommending surgery, however family is undecided given CT A/P findings or colon wall thickening and elevated CEA.     Patient also report to have ovarian cancer and had oopherectomy 2 years ago, unclear if chemo needed.     Oncology consulted for further management         PAST MEDICAL & SURGICAL HISTORY:  HTN (hypertension)      HLD (hyperlipidemia)      Hypothyroidism      Major depression      DM (diabetes mellitus)      Sciatica            FAMILY HISTORY:  No pertinent family history in first degree relatives        MEDICATIONS  (STANDING):  baclofen 10 milliGRAM(s) Oral every 8 hours  dextrose 5%. 1000 milliLiter(s) (50 mL/Hr) IV Continuous <Continuous>  dextrose 5%. 1000 milliLiter(s) (100 mL/Hr) IV Continuous <Continuous>  dextrose 50% Injectable 25 Gram(s) IV Push once  dextrose 50% Injectable 12.5 Gram(s) IV Push once  dextrose 50% Injectable 25 Gram(s) IV Push once  enoxaparin Injectable 40 milliGRAM(s) SubCutaneous <User Schedule>  FLUoxetine 20 milliGRAM(s) Oral daily  gabapentin 400 milliGRAM(s) Oral three times a day  glucagon  Injectable 1 milliGRAM(s) IntraMuscular once  insulin lispro (ADMELOG) corrective regimen sliding scale   SubCutaneous three times a day before meals  insulin lispro (ADMELOG) corrective regimen sliding scale   SubCutaneous at bedtime  lactulose Syrup 10 Gram(s) Oral two times a day  levothyroxine 75 MICROGram(s) Oral daily  losartan 100 milliGRAM(s) Oral daily  pantoprazole    Tablet 40 milliGRAM(s) Oral before breakfast  polyethylene glycol 3350 17 Gram(s) Oral two times a day  saline laxative (FLEET) Rectal Enema 1 Enema Rectal daily  senna 2 Tablet(s) Oral at bedtime  simvastatin 20 milliGRAM(s) Oral at bedtime    MEDICATIONS  (PRN):  acetaminophen     Tablet .. 650 milliGRAM(s) Oral every 6 hours PRN Temp greater or equal to 38C (100.4F), Moderate Pain (4 - 6)  bisacodyl Suppository 10 milliGRAM(s) Rectal daily PRN Constipation  dextrose Oral Gel 15 Gram(s) Oral once PRN Blood Glucose LESS THAN 70 milliGRAM(s)/deciliter  melatonin 6 milliGRAM(s) Oral at bedtime PRN Sleep  oxyCODONE    IR 5 milliGRAM(s) Oral every 6 hours PRN Severe Pain (7 - 10)      Allergies    penicillins (Rash)    Intolerances        SOCIAL HISTORY: No EtOH, no tobacco        T(F): 98.3 (01-02-23 @ 06:00), Max: 98.8 (01-01-23 @ 09:36)  HR: 64 (01-02-23 @ 06:00)  BP: 123/56 (01-02-23 @ 06:00)  RR: 18 (01-02-23 @ 06:00)  SpO2: 96% (01-02-23 @ 06:00)  Wt(kg): --    GENERAL: NAD, well-developed  HEAD:  Atraumatic, Normocephalic  EYES: EOMI, PERRLA, conjunctiva and sclera clear  NECK: Supple, No JVD  CHEST/LUNG: Clear to auscultation bilaterally; No wheeze  HEART: Regular rate and rhythm; No murmurs, rubs, or gallops  ABDOMEN: Soft, Nontender, Nondistended; Bowel sounds present  EXTREMITIES:  2+ Peripheral Pulses, No clubbing, cyanosis, or edema  NEUROLOGY: non-focal  SKIN: No rashes or lesions                          9.9    9.33  )-----------( 295      ( 01 Jan 2023 05:58 )             31.8       01-01    133<L>  |  95<L>  |  24<H>  ----------------------------<  152<H>  4.7   |  26  |  0.91    Ca    9.5      01 Jan 2023 05:58            Vitamin B12, Serum: 459 pg/mL (12-30-22 @ 07:15)  Folate, Serum: 6.2 ng/mL (12-30-22 @ 07:15)  Homocysteine, Serum: 17.3 umol/L (12-30-22 @ 07:15)

## 2023-01-02 NOTE — CONSULT NOTE ADULT - ASSESSMENT
78 yo F PMH HTN, HLD, DM on metformin, hypothyroidism, depression, and sciatica presents with b/l lower extremity weakness found to have sever spinal stenosis now pending possible neurosurgical intervention. Oncology consulted given elevated CEA level and questionable area of wall thickening vs peristalisis in the rectosignmoid colon.     # Hx of ovarian Cancer s/p TABSO  # R/O rectosigmoid malignancy   - Follows with an oncologist at [facility] --> Team to obtain records for review   - Labs at admission significant for microcytic anemia, normal wbc, normal platelets. LFTs at admission wnl  - CT C/A/P showing questionable area of wall thickening vs peristalsis in the rectosigmoid colon.   - Tumor markers subsequently drawn show elevation in CEA 35.4, but normal , Ca 19-9.   - CEA elevation by itself does not give a colon cancer diagnosis. CT A/P showing questionable area of thickening. Unclear if this was a prior diagnosis of colon cancer vs a new finding on imaging this admission as no prior records available for review. Do not have patients prior colonoscopy results either.  - At this time, can not comment on possible malignancy. Ultimately, patient would need a colonoscopy with biopsy of any suspicious area to rule in or rule out colon carcinoma.   - GIven microcytic anemia, would recommend Iron studies, TIBC, ferritin     INCOMPLETE     Charlotte Wolf MD PGY5 heme onc fellow      76 yo F PMH HTN, HLD, DM on metformin, hypothyroidism, depression, and sciatica presents with b/l lower extremity weakness found to have sever spinal stenosis now pending possible neurosurgical intervention. Oncology consulted given elevated CEA level and questionable area of wall thickening vs peristalisis in the rectosignmoid colon.     # Hx of ovarian Cancer s/p TABSO  # R/O rectosigmoid malignancy   - Follows with an oncologist at [facility] --> Team to obtain records for review   - Patient unable to provide oncology history other then vague "stomach cancer and they took out my ovaries". Requested we speak with daughter, but can not provide phone number.   - Labs at admission significant for microcytic anemia, normal wbc, normal platelets. LFTs at admission wnl  - CT C/A/P showing questionable area of wall thickening vs peristalsis in the rectosigmoid colon.   - Tumor markers subsequently drawn show elevation in CEA 35.4, but normal , Ca 19-9.   - CEA elevation by itself does not give a colon cancer diagnosis. CT A/P showing questionable area of thickening. Unclear if this was a prior diagnosis of colon cancer vs a new finding on imaging this admission as no prior records available for review. Do not have patients prior colonoscopy results either.  - At this time, can not comment on possible malignancy. Ultimately, patient would need a colonoscopy with biopsy of any suspicious area to rule in or rule out colon carcinoma.   - GIven microcytic anemia, would recommend Iron studies, TIBC, ferritin   - Attempted to reach daughter listed in emergency contact, [place], however no answer. Will attempt to reach again.     Charlotte Wolf MD   PGY5 heme onc fellow      76 yo F PMH HTN, HLD, DM on metformin, hypothyroidism, depression, and sciatica presents with b/l lower extremity weakness found to have sever spinal stenosis now pending possible neurosurgical intervention. Oncology consulted given elevated CEA level and questionable area of wall thickening vs peristalisis in the rectosignmoid colon.     # Hx of ovarian Cancer s/p RHONDA/BSO  # R/O rectosigmoid malignancy   - Follows with an oncologist at [facility] --> Team to obtain records for review   - Patient unable to provide oncology history other then vague "stomach cancer and they took out my ovaries". Requested we speak with daughter, but can not provide phone number.   - Labs at admission significant for microcytic anemia, normal wbc, normal platelets. LFTs at admission wnl  - CT C/A/P showing questionable area of wall thickening vs peristalsis in the rectosigmoid colon.   - Tumor markers subsequently drawn show elevation in CEA 35.4, but normal , Ca 19-9.   - CEA elevation by itself does not give a colon cancer diagnosis. CT A/P showing questionable area of thickening. Unclear if this was a prior diagnosis of colon cancer vs a new finding on imaging this admission as no prior records available for review. Do not have patients prior colonoscopy results either.  - At this time, can not comment on possible malignancy. Ultimately, patient would need a colonoscopy with biopsy of any suspicious area to rule in or rule out colon carcinoma.   - GIven microcytic anemia, would recommend Iron studies, TIBC, ferritin   - Attempted to reach daughter listed in emergency contact, [place], however no answer. Will attempt to reach again.     Charlotte Wolf MD   PGY5 heme onc fellow

## 2023-01-02 NOTE — PHYSICAL THERAPY INITIAL EVALUATION ADULT - IMPAIRMENTS FOUND, PT EVAL
aerobic capacity/endurance/decreased midline orientation/ergonomics and body mechanics/fine motor/gait, locomotion, and balance/gross motor/muscle strength/neuromotor development and sensory integration/posture/ROM

## 2023-01-02 NOTE — CONSULT NOTE ADULT - ATTENDING COMMENTS
-----------------------------------------------------------------------------------------------------  Patient unable to provide reliable history and defers all questions to her daughter; reported history of ovarian cancer with RHONDA/BSO, followed by doctors at [facility], presented with weakness and inability to walk, noted to have severe spinal stenosis.    - incidental finding of sigmoid wall thickening; this finding should be investigated with colonoscopy for further evaluation. CEA level is non-specific and is NOT diagnostic for presence of malignancy  - microcytic anemia warrants further investigation with iron studies including TIBC and Ferritin level  - further recommendations pending results of above and would certainly obtain prior records with respect to her ovarian cancer which patient reports are at [facility].

## 2023-01-03 DIAGNOSIS — Z98.1 ARTHRODESIS STATUS: Chronic | ICD-10-CM

## 2023-01-03 LAB
ANION GAP SERPL CALC-SCNC: 12 MMOL/L — SIGNIFICANT CHANGE UP (ref 7–14)
APPEARANCE UR: ABNORMAL
BACTERIA # UR AUTO: ABNORMAL
BILIRUB UR-MCNC: NEGATIVE — SIGNIFICANT CHANGE UP
BUN SERPL-MCNC: 19 MG/DL — SIGNIFICANT CHANGE UP (ref 7–23)
CALCIUM SERPL-MCNC: 9.5 MG/DL — SIGNIFICANT CHANGE UP (ref 8.4–10.5)
CHLORIDE SERPL-SCNC: 100 MMOL/L — SIGNIFICANT CHANGE UP (ref 98–107)
CO2 SERPL-SCNC: 27 MMOL/L — SIGNIFICANT CHANGE UP (ref 22–31)
COLOR SPEC: SIGNIFICANT CHANGE UP
CREAT SERPL-MCNC: 1.01 MG/DL — SIGNIFICANT CHANGE UP (ref 0.5–1.3)
DIFF PNL FLD: ABNORMAL
EGFR: 57 ML/MIN/1.73M2 — LOW
EPI CELLS # UR: 1 /HPF — SIGNIFICANT CHANGE UP (ref 0–5)
FERRITIN SERPL-MCNC: 31 NG/ML — SIGNIFICANT CHANGE UP (ref 15–150)
GLUCOSE BLDC GLUCOMTR-MCNC: 130 MG/DL — HIGH (ref 70–99)
GLUCOSE BLDC GLUCOMTR-MCNC: 160 MG/DL — HIGH (ref 70–99)
GLUCOSE BLDC GLUCOMTR-MCNC: 165 MG/DL — HIGH (ref 70–99)
GLUCOSE BLDC GLUCOMTR-MCNC: 182 MG/DL — HIGH (ref 70–99)
GLUCOSE SERPL-MCNC: 163 MG/DL — HIGH (ref 70–99)
GLUCOSE UR QL: NEGATIVE — SIGNIFICANT CHANGE UP
HCT VFR BLD CALC: 31.8 % — LOW (ref 34.5–45)
HGB BLD-MCNC: 9.9 G/DL — LOW (ref 11.5–15.5)
HYALINE CASTS # UR AUTO: 0 /LPF — SIGNIFICANT CHANGE UP (ref 0–7)
IRON SATN MFR SERPL: 28 UG/DL — LOW (ref 30–160)
IRON SATN MFR SERPL: 7 % — LOW (ref 14–50)
KETONES UR-MCNC: NEGATIVE — SIGNIFICANT CHANGE UP
LEUKOCYTE ESTERASE UR-ACNC: ABNORMAL
MCHC RBC-ENTMCNC: 24 PG — LOW (ref 27–34)
MCHC RBC-ENTMCNC: 31.1 GM/DL — LOW (ref 32–36)
MCV RBC AUTO: 77.2 FL — LOW (ref 80–100)
METHYLMALONATE SERPL-SCNC: 228 NMOL/L — SIGNIFICANT CHANGE UP (ref 0–378)
NITRITE UR-MCNC: NEGATIVE — SIGNIFICANT CHANGE UP
NRBC # BLD: 0 /100 WBCS — SIGNIFICANT CHANGE UP (ref 0–0)
NRBC # FLD: 0 K/UL — SIGNIFICANT CHANGE UP (ref 0–0)
PH UR: 7 — SIGNIFICANT CHANGE UP (ref 5–8)
PLATELET # BLD AUTO: 322 K/UL — SIGNIFICANT CHANGE UP (ref 150–400)
POTASSIUM SERPL-MCNC: 4.9 MMOL/L — SIGNIFICANT CHANGE UP (ref 3.5–5.3)
POTASSIUM SERPL-SCNC: 4.9 MMOL/L — SIGNIFICANT CHANGE UP (ref 3.5–5.3)
PROT UR-MCNC: NEGATIVE — SIGNIFICANT CHANGE UP
RBC # BLD: 4.12 M/UL — SIGNIFICANT CHANGE UP (ref 3.8–5.2)
RBC # FLD: 15.3 % — HIGH (ref 10.3–14.5)
RBC CASTS # UR COMP ASSIST: SIGNIFICANT CHANGE UP /HPF (ref 0–4)
SODIUM SERPL-SCNC: 139 MMOL/L — SIGNIFICANT CHANGE UP (ref 135–145)
SP GR SPEC: 1.01 — SIGNIFICANT CHANGE UP (ref 1.01–1.05)
TIBC SERPL-MCNC: 376 UG/DL — SIGNIFICANT CHANGE UP (ref 220–430)
UIBC SERPL-MCNC: 348 UG/DL — SIGNIFICANT CHANGE UP (ref 110–370)
UROBILINOGEN FLD QL: SIGNIFICANT CHANGE UP
WBC # BLD: 10.17 K/UL — SIGNIFICANT CHANGE UP (ref 3.8–10.5)
WBC # FLD AUTO: 10.17 K/UL — SIGNIFICANT CHANGE UP (ref 3.8–10.5)
WBC UR QL: 8 /HPF — HIGH (ref 0–5)

## 2023-01-03 PROCEDURE — 99232 SBSQ HOSP IP/OBS MODERATE 35: CPT

## 2023-01-03 PROCEDURE — 99233 SBSQ HOSP IP/OBS HIGH 50: CPT

## 2023-01-03 RX ORDER — CIPROFLOXACIN LACTATE 400MG/40ML
500 VIAL (ML) INTRAVENOUS EVERY 24 HOURS
Refills: 0 | Status: DISCONTINUED | OUTPATIENT
Start: 2023-01-03 | End: 2023-01-04

## 2023-01-03 RX ADMIN — Medication 10 MILLIGRAM(S): at 14:01

## 2023-01-03 RX ADMIN — LOSARTAN POTASSIUM 100 MILLIGRAM(S): 100 TABLET, FILM COATED ORAL at 06:29

## 2023-01-03 RX ADMIN — Medication 650 MILLIGRAM(S): at 16:13

## 2023-01-03 RX ADMIN — Medication 2: at 11:25

## 2023-01-03 RX ADMIN — Medication 20 MILLIGRAM(S): at 05:11

## 2023-01-03 RX ADMIN — ENOXAPARIN SODIUM 40 MILLIGRAM(S): 100 INJECTION SUBCUTANEOUS at 22:59

## 2023-01-03 RX ADMIN — Medication 650 MILLIGRAM(S): at 15:16

## 2023-01-03 RX ADMIN — GABAPENTIN 400 MILLIGRAM(S): 400 CAPSULE ORAL at 06:29

## 2023-01-03 RX ADMIN — LACTULOSE 10 GRAM(S): 10 SOLUTION ORAL at 06:28

## 2023-01-03 RX ADMIN — Medication 10 MILLIGRAM(S): at 06:29

## 2023-01-03 RX ADMIN — GABAPENTIN 400 MILLIGRAM(S): 400 CAPSULE ORAL at 22:59

## 2023-01-03 RX ADMIN — Medication 500 MILLIGRAM(S): at 18:16

## 2023-01-03 RX ADMIN — Medication 10 MILLIGRAM(S): at 22:59

## 2023-01-03 RX ADMIN — GABAPENTIN 400 MILLIGRAM(S): 400 CAPSULE ORAL at 14:00

## 2023-01-03 RX ADMIN — SIMVASTATIN 20 MILLIGRAM(S): 20 TABLET, FILM COATED ORAL at 22:59

## 2023-01-03 RX ADMIN — Medication 2: at 17:18

## 2023-01-03 RX ADMIN — OXYCODONE HYDROCHLORIDE 5 MILLIGRAM(S): 5 TABLET ORAL at 11:25

## 2023-01-03 RX ADMIN — SENNA PLUS 2 TABLET(S): 8.6 TABLET ORAL at 22:59

## 2023-01-03 RX ADMIN — Medication 75 MICROGRAM(S): at 06:29

## 2023-01-03 RX ADMIN — PANTOPRAZOLE SODIUM 40 MILLIGRAM(S): 20 TABLET, DELAYED RELEASE ORAL at 07:51

## 2023-01-03 RX ADMIN — OXYCODONE HYDROCHLORIDE 5 MILLIGRAM(S): 5 TABLET ORAL at 12:25

## 2023-01-03 NOTE — PROGRESS NOTE ADULT - SUBJECTIVE AND OBJECTIVE BOX
78 yo F PMH HTN, HLD, DM on metformin, hypothyroidism, depression, and sciatica presents with b/l lower extremity weakness. Pt reports she had a fall about 2 weeks ago and weakness began suddenly one week ago. She has been unable to get out of bed and walk at home. Pt states she has associated numbness, tingling, and pain 7/10 constant in her posterior thighs and lower legs. Pt took acetaminophen at home for pain which helped. Pt also endorses urinary incontinence and stool incontinence and chronic dysuria. Pt denies fever, chills, headache, CP, SOB, N/V/D, or constipation.  Does have lower back pain, non radicular  Patient also report to have ovarian cancer and had oopherectomy 2 years ago, unclear if chemo needed.   PMD is out of [place] Dr. Jorge L Hung from [place]     PAST MEDICAL & SURGICAL HISTORY:  HTN (hypertension)  HLD (hyperlipidemia)  Hypothyroidism  Major depression  DM (diabetes mellitus)  Sciatica  No significant past surgical history    No issues overnight    Vital Signs Last 24 Hrs  T(C): 37.3 (02 Jan 2023 21:40), Max: 37.3 (02 Jan 2023 21:40)  T(F): 99.2 (02 Jan 2023 21:40), Max: 99.2 (02 Jan 2023 21:40)  HR: 72 (02 Jan 2023 21:40) (64 - 84)  BP: 125/69 (02 Jan 2023 21:40) (119/59 - 150/64)  BP(mean): --  ABP: --  ABP(mean): --  RR: 18 (02 Jan 2023 21:40) (17 - 18)  SpO2: 97% (02 Jan 2023 21:40) (96% - 100%)    O2 Parameters below as of 02 Jan 2023 21:40  Patient On (Oxygen Delivery Method): room air    AAO X 3  PERRLA, EOMI  Face symmetric   Motor: Bilateral UE 5/5  RLE: HF 2/5, KE 3/5, DF/PF 0/5  LLE: HF 3/5, KE 3/5, DF/PF 1/5  no clonus    MEDICATIONS  (STANDING):  baclofen 10 milliGRAM(s) Oral every 8 hours  dextrose 5%. 1000 milliLiter(s) (50 mL/Hr) IV Continuous <Continuous>  dextrose 5%. 1000 milliLiter(s) (100 mL/Hr) IV Continuous <Continuous>  dextrose 50% Injectable 25 Gram(s) IV Push once  dextrose 50% Injectable 12.5 Gram(s) IV Push once  dextrose 50% Injectable 25 Gram(s) IV Push once  enoxaparin Injectable 40 milliGRAM(s) SubCutaneous <User Schedule>  FLUoxetine 20 milliGRAM(s) Oral daily  gabapentin 400 milliGRAM(s) Oral three times a day  glucagon  Injectable 1 milliGRAM(s) IntraMuscular once  insulin lispro (ADMELOG) corrective regimen sliding scale   SubCutaneous three times a day before meals  insulin lispro (ADMELOG) corrective regimen sliding scale   SubCutaneous at bedtime  lactulose Syrup 10 Gram(s) Oral two times a day  levothyroxine 75 MICROGram(s) Oral daily  losartan 100 milliGRAM(s) Oral daily  pantoprazole    Tablet 40 milliGRAM(s) Oral before breakfast  polyethylene glycol 3350 17 Gram(s) Oral two times a day  saline laxative (FLEET) Rectal Enema 1 Enema Rectal daily  senna 2 Tablet(s) Oral at bedtime  simvastatin 20 milliGRAM(s) Oral at bedtime    MEDICATIONS  (PRN):  acetaminophen     Tablet .. 650 milliGRAM(s) Oral every 6 hours PRN Temp greater or equal to 38C (100.4F), Moderate Pain (4 - 6)  bisacodyl Suppository 10 milliGRAM(s) Rectal daily PRN Constipation  dextrose Oral Gel 15 Gram(s) Oral once PRN Blood Glucose LESS THAN 70 milliGRAM(s)/deciliter  melatonin 6 milliGRAM(s) Oral at bedtime PRN Sleep  oxyCODONE    IR 5 milliGRAM(s) Oral every 6 hours PRN Severe Pain (7 - 10)

## 2023-01-03 NOTE — PROGRESS NOTE ADULT - SUBJECTIVE AND OBJECTIVE BOX
Debbie Avalos  [facility] Medicine  Pager #[phone]  Available on [employer] Teams    Patient is a 77y old  Female who presents with a chief complaint of cervical, thoracic stenosis (03 Jan 2023 00:57)      SUBJECTIVE / OVERNIGHT EVENTS: Patient seen and examined at bedside. Pt c/o lower back pain.    ADDITIONAL REVIEW OF SYSTEMS:    MEDICATIONS  (STANDING):  baclofen 10 milliGRAM(s) Oral every 8 hours  dextrose 5%. 1000 milliLiter(s) (100 mL/Hr) IV Continuous <Continuous>  dextrose 5%. 1000 milliLiter(s) (50 mL/Hr) IV Continuous <Continuous>  dextrose 50% Injectable 25 Gram(s) IV Push once  dextrose 50% Injectable 12.5 Gram(s) IV Push once  dextrose 50% Injectable 25 Gram(s) IV Push once  enoxaparin Injectable 40 milliGRAM(s) SubCutaneous <User Schedule>  FLUoxetine 20 milliGRAM(s) Oral daily  gabapentin 400 milliGRAM(s) Oral three times a day  glucagon  Injectable 1 milliGRAM(s) IntraMuscular once  insulin lispro (ADMELOG) corrective regimen sliding scale   SubCutaneous three times a day before meals  insulin lispro (ADMELOG) corrective regimen sliding scale   SubCutaneous at bedtime  lactulose Syrup 10 Gram(s) Oral two times a day  levothyroxine 75 MICROGram(s) Oral daily  losartan 100 milliGRAM(s) Oral daily  pantoprazole    Tablet 40 milliGRAM(s) Oral before breakfast  polyethylene glycol 3350 17 Gram(s) Oral two times a day  saline laxative (FLEET) Rectal Enema 1 Enema Rectal daily  senna 2 Tablet(s) Oral at bedtime  simvastatin 20 milliGRAM(s) Oral at bedtime    MEDICATIONS  (PRN):  acetaminophen     Tablet .. 650 milliGRAM(s) Oral every 6 hours PRN Temp greater or equal to 38C (100.4F), Moderate Pain (4 - 6)  bisacodyl Suppository 10 milliGRAM(s) Rectal daily PRN Constipation  dextrose Oral Gel 15 Gram(s) Oral once PRN Blood Glucose LESS THAN 70 milliGRAM(s)/deciliter  melatonin 6 milliGRAM(s) Oral at bedtime PRN Sleep  oxyCODONE    IR 5 milliGRAM(s) Oral every 6 hours PRN Severe Pain (7 - 10)      CAPILLARY BLOOD GLUCOSE      POCT Blood Glucose.: 182 mg/dL (03 Jan 2023 11:17)  POCT Blood Glucose.: 130 mg/dL (03 Jan 2023 07:23)  POCT Blood Glucose.: 181 mg/dL (02 Jan 2023 22:26)  POCT Blood Glucose.: 209 mg/dL (02 Jan 2023 16:27)    I&O's Summary    02 Jan 2023 07:01  -  03 Jan 2023 07:00  --------------------------------------------------------  IN: 0 mL / OUT: 1750 mL / NET: -1750 mL    03 Jan 2023 07:01  -  03 Jan 2023 14:08  --------------------------------------------------------  IN: 0 mL / OUT: 300 mL / NET: -300 mL        PHYSICAL EXAM:    Vital Signs Last 24 Hrs  T(C): 37.2 (03 Jan 2023 14:05), Max: 37.6 (03 Jan 2023 09:49)  T(F): 98.9 (03 Jan 2023 14:05), Max: 99.6 (03 Jan 2023 09:49)  HR: 85 (03 Jan 2023 14:05) (62 - 85)  BP: 131/74 (03 Jan 2023 14:05) (125/69 - 150/64)  BP(mean): --  RR: 17 (03 Jan 2023 14:05) (16 - 18)  SpO2: 96% (03 Jan 2023 14:05) (94% - 100%)    Parameters below as of 03 Jan 2023 14:05  Patient On (Oxygen Delivery Method): room air        CONSTITUTIONAL: NAD, well-developed, well-groomed  EYES: Conjunctiva and sclera clear  RESPIRATORY: Normal respiratory effort; lungs are clear to auscultation bilaterally  CARDIOVASCULAR: Regular rate and rhythm, normal S1 and S2, no murmur/rub/gallop; No lower extremity edema  ABDOMEN: Soft, nontender to palpation, normoactive bowel sounds  MUSCULOSKELETAL: No clubbing or cyanosis of digits; no joint swelling or tenderness to palpation  PSYCH: A+O to person, place, and time; affect appropriate  NEUROLOGY: 3/5 strength lower extremities  SKIN: Old surgical scar on abdomen    LABS:                        9.9    10.17 )-----------( 322      ( 03 Jan 2023 13:30 )             31.8     01-03    139  |  100  |  19  ----------------------------<  163<H>  4.9   |  27  |  1.01    Ca    9.5      03 Jan 2023 13:30    RADIOLOGY & ADDITIONAL TESTS: No new imaging  Results Reviewed: Yes  Imaging Personally Reviewed:  Electrocardiogram Personally Reviewed:    COORDINATION OF CARE:  Care Discussed with Consultants/Other Providers [Y/N]: Yes- neurosurgery  Prior or Outpatient Records Reviewed [Y/N]: Yes

## 2023-01-04 DIAGNOSIS — N39.0 URINARY TRACT INFECTION, SITE NOT SPECIFIED: ICD-10-CM

## 2023-01-04 LAB
ANION GAP SERPL CALC-SCNC: 9 MMOL/L — SIGNIFICANT CHANGE UP (ref 7–14)
BUN SERPL-MCNC: 21 MG/DL — SIGNIFICANT CHANGE UP (ref 7–23)
CALCIUM SERPL-MCNC: 9.2 MG/DL — SIGNIFICANT CHANGE UP (ref 8.4–10.5)
CHLORIDE SERPL-SCNC: 99 MMOL/L — SIGNIFICANT CHANGE UP (ref 98–107)
CO2 SERPL-SCNC: 27 MMOL/L — SIGNIFICANT CHANGE UP (ref 22–31)
COPPER SERPL-MCNC: 151 UG/DL — SIGNIFICANT CHANGE UP (ref 80–158)
CREAT SERPL-MCNC: 0.91 MG/DL — SIGNIFICANT CHANGE UP (ref 0.5–1.3)
EGFR: 64 ML/MIN/1.73M2 — SIGNIFICANT CHANGE UP
GLUCOSE BLDC GLUCOMTR-MCNC: 135 MG/DL — HIGH (ref 70–99)
GLUCOSE BLDC GLUCOMTR-MCNC: 148 MG/DL — HIGH (ref 70–99)
GLUCOSE BLDC GLUCOMTR-MCNC: 174 MG/DL — HIGH (ref 70–99)
GLUCOSE BLDC GLUCOMTR-MCNC: 189 MG/DL — HIGH (ref 70–99)
GLUCOSE SERPL-MCNC: 144 MG/DL — HIGH (ref 70–99)
HCT VFR BLD CALC: 29 % — LOW (ref 34.5–45)
HGB BLD-MCNC: 9 G/DL — LOW (ref 11.5–15.5)
MCHC RBC-ENTMCNC: 23.7 PG — LOW (ref 27–34)
MCHC RBC-ENTMCNC: 31 GM/DL — LOW (ref 32–36)
MCV RBC AUTO: 76.5 FL — LOW (ref 80–100)
NRBC # BLD: 0 /100 WBCS — SIGNIFICANT CHANGE UP (ref 0–0)
NRBC # FLD: 0 K/UL — SIGNIFICANT CHANGE UP (ref 0–0)
PLATELET # BLD AUTO: 306 K/UL — SIGNIFICANT CHANGE UP (ref 150–400)
POTASSIUM SERPL-MCNC: 4.8 MMOL/L — SIGNIFICANT CHANGE UP (ref 3.5–5.3)
POTASSIUM SERPL-SCNC: 4.8 MMOL/L — SIGNIFICANT CHANGE UP (ref 3.5–5.3)
RBC # BLD: 3.79 M/UL — LOW (ref 3.8–5.2)
RBC # FLD: 15.3 % — HIGH (ref 10.3–14.5)
SODIUM SERPL-SCNC: 135 MMOL/L — SIGNIFICANT CHANGE UP (ref 135–145)
WBC # BLD: 9.53 K/UL — SIGNIFICANT CHANGE UP (ref 3.8–10.5)
WBC # FLD AUTO: 9.53 K/UL — SIGNIFICANT CHANGE UP (ref 3.8–10.5)

## 2023-01-04 PROCEDURE — 93306 TTE W/DOPPLER COMPLETE: CPT | Mod: 26

## 2023-01-04 PROCEDURE — 99231 SBSQ HOSP IP/OBS SF/LOW 25: CPT

## 2023-01-04 PROCEDURE — 99233 SBSQ HOSP IP/OBS HIGH 50: CPT

## 2023-01-04 RX ORDER — CIPROFLOXACIN LACTATE 400MG/40ML
500 VIAL (ML) INTRAVENOUS EVERY 12 HOURS
Refills: 0 | Status: DISCONTINUED | OUTPATIENT
Start: 2023-01-04 | End: 2023-01-06

## 2023-01-04 RX ADMIN — Medication 2: at 16:54

## 2023-01-04 RX ADMIN — OXYCODONE HYDROCHLORIDE 5 MILLIGRAM(S): 5 TABLET ORAL at 10:12

## 2023-01-04 RX ADMIN — Medication 2: at 11:31

## 2023-01-04 RX ADMIN — Medication 75 MICROGRAM(S): at 06:34

## 2023-01-04 RX ADMIN — Medication 650 MILLIGRAM(S): at 23:00

## 2023-01-04 RX ADMIN — LACTULOSE 10 GRAM(S): 10 SOLUTION ORAL at 06:34

## 2023-01-04 RX ADMIN — GABAPENTIN 400 MILLIGRAM(S): 400 CAPSULE ORAL at 22:09

## 2023-01-04 RX ADMIN — OXYCODONE HYDROCHLORIDE 5 MILLIGRAM(S): 5 TABLET ORAL at 09:12

## 2023-01-04 RX ADMIN — Medication 10 MILLIGRAM(S): at 22:09

## 2023-01-04 RX ADMIN — GABAPENTIN 400 MILLIGRAM(S): 400 CAPSULE ORAL at 14:00

## 2023-01-04 RX ADMIN — Medication 500 MILLIGRAM(S): at 17:20

## 2023-01-04 RX ADMIN — SIMVASTATIN 20 MILLIGRAM(S): 20 TABLET, FILM COATED ORAL at 22:09

## 2023-01-04 RX ADMIN — Medication 10 MILLIGRAM(S): at 06:34

## 2023-01-04 RX ADMIN — Medication 20 MILLIGRAM(S): at 05:09

## 2023-01-04 RX ADMIN — Medication 650 MILLIGRAM(S): at 22:08

## 2023-01-04 RX ADMIN — ENOXAPARIN SODIUM 40 MILLIGRAM(S): 100 INJECTION SUBCUTANEOUS at 22:09

## 2023-01-04 RX ADMIN — Medication 6 MILLIGRAM(S): at 01:30

## 2023-01-04 RX ADMIN — Medication 10 MILLIGRAM(S): at 13:59

## 2023-01-04 RX ADMIN — PANTOPRAZOLE SODIUM 40 MILLIGRAM(S): 20 TABLET, DELAYED RELEASE ORAL at 06:34

## 2023-01-04 RX ADMIN — Medication 6 MILLIGRAM(S): at 23:14

## 2023-01-04 RX ADMIN — GABAPENTIN 400 MILLIGRAM(S): 400 CAPSULE ORAL at 06:35

## 2023-01-04 RX ADMIN — LOSARTAN POTASSIUM 100 MILLIGRAM(S): 100 TABLET, FILM COATED ORAL at 06:34

## 2023-01-04 NOTE — PROGRESS NOTE ADULT - PROBLEM SELECTOR PLAN 1
-CXR and EKG reviewed   -difficult to assess METs as patient has not been ambulating much since her fall and much for the past 2 years  -TTE reviewed- grossly normal LV systolic function, grade 1 diastolic dysfunction  -LE dopplers negative for DVT  -awaiting surgery plan per neurosurgery -- per neurosurgery patient's family member reports colon cancer and poor baseline status not ambulating much for the past 2 years. Patient was tentatively planned for OR, but case was cancelled after discussion between NSx and Daughter who reports PMHx of progressive ?colon cancer  -CT Chest, Abd and Pelvis reviewed  -seen by oncology  -awaiting surgery recs

## 2023-01-04 NOTE — PROGRESS NOTE ADULT - SUBJECTIVE AND OBJECTIVE BOX
76 yo F PMH HTN, HLD, DM on metformin, hypothyroidism, depression, and sciatica presents with b/l lower extremity weakness. Pt reports she had a fall about 2 weeks ago and weakness began suddenly one week ago. She has been unable to get out of bed and walk at home. Pt states she has associated numbness, tingling, and pain 7/10 constant in her posterior thighs and lower legs. Pt took acetaminophen at home for pain which helped. Pt also endorses urinary incontinence and stool incontinence and chronic dysuria. Pt denies fever, chills, headache, CP, SOB, N/V/D, or constipation.  Does have lower back pain, non radicular  Patient also report to have ovarian cancer and had oopherectomy 2 years ago, unclear if chemo needed.   PMD is out of [place] Dr. Jorge L Hung from [place]     PAST MEDICAL & SURGICAL HISTORY:  HTN (hypertension)  HLD (hyperlipidemia)  Hypothyroidism  Major depression  DM (diabetes mellitus)  Sciatica  No significant past surgical history    No issues overnight    Vital Signs Last 24 Hrs  T(C): 36.9 (03 Jan 2023 17:47), Max: 37.6 (03 Jan 2023 09:49)  T(F): 98.5 (03 Jan 2023 17:47), Max: 99.6 (03 Jan 2023 09:49)  HR: 73 (03 Jan 2023 17:47) (62 - 85)  BP: 136/55 (03 Jan 2023 17:47) (131/60 - 144/50)  BP(mean): --  ABP: --  ABP(mean): --  RR: 17 (03 Jan 2023 17:47) (16 - 17)  SpO2: 94% (03 Jan 2023 17:47) (94% - 96%)    O2 Parameters below as of 03 Jan 2023 17:47  Patient On (Oxygen Delivery Method): room air      AAO X 3  PERRLA, EOMI  Face symmetric   Motor: Bilateral UE 5/5  RLE: HF 2/5, KE 3/5, DF/PF 0/5  LLE: HF 3/5, KE 3/5, DF/PF 1/5  no clonus    MEDICATIONS  (STANDING):  baclofen 10 milliGRAM(s) Oral every 8 hours  dextrose 5%. 1000 milliLiter(s) (50 mL/Hr) IV Continuous <Continuous>  dextrose 5%. 1000 milliLiter(s) (100 mL/Hr) IV Continuous <Continuous>  dextrose 50% Injectable 25 Gram(s) IV Push once  dextrose 50% Injectable 12.5 Gram(s) IV Push once  dextrose 50% Injectable 25 Gram(s) IV Push once  enoxaparin Injectable 40 milliGRAM(s) SubCutaneous <User Schedule>  FLUoxetine 20 milliGRAM(s) Oral daily  gabapentin 400 milliGRAM(s) Oral three times a day  glucagon  Injectable 1 milliGRAM(s) IntraMuscular once  insulin lispro (ADMELOG) corrective regimen sliding scale   SubCutaneous three times a day before meals  insulin lispro (ADMELOG) corrective regimen sliding scale   SubCutaneous at bedtime  lactulose Syrup 10 Gram(s) Oral two times a day  levothyroxine 75 MICROGram(s) Oral daily  losartan 100 milliGRAM(s) Oral daily  pantoprazole    Tablet 40 milliGRAM(s) Oral before breakfast  polyethylene glycol 3350 17 Gram(s) Oral two times a day  saline laxative (FLEET) Rectal Enema 1 Enema Rectal daily  senna 2 Tablet(s) Oral at bedtime  simvastatin 20 milliGRAM(s) Oral at bedtime    MEDICATIONS  (PRN):  acetaminophen     Tablet .. 650 milliGRAM(s) Oral every 6 hours PRN Temp greater or equal to 38C (100.4F), Moderate Pain (4 - 6)  bisacodyl Suppository 10 milliGRAM(s) Rectal daily PRN Constipation  dextrose Oral Gel 15 Gram(s) Oral once PRN Blood Glucose LESS THAN 70 milliGRAM(s)/deciliter  melatonin 6 milliGRAM(s) Oral at bedtime PRN Sleep  oxyCODONE    IR 5 milliGRAM(s) Oral every 6 hours PRN Severe Pain (7 - 10)

## 2023-01-04 NOTE — PROGRESS NOTE ADULT - SUBJECTIVE AND OBJECTIVE BOX
Debbie Avalos  [facility]  Pager #[phone]  Available on [employer] Teams    Patient is a 77y old  Female who presents with a chief complaint of cervical, thoracic stenosis (2023 00:32)      SUBJECTIVE / OVERNIGHT EVENTS: Patient seen and examined at bedside. Pt c/o dysuria, chronic lower back pain.    ADDITIONAL REVIEW OF SYSTEMS:    MEDICATIONS  (STANDING):  baclofen 10 milliGRAM(s) Oral every 8 hours  ciprofloxacin     Tablet 500 milliGRAM(s) Oral every 12 hours  dextrose 5%. 1000 milliLiter(s) (50 mL/Hr) IV Continuous <Continuous>  dextrose 5%. 1000 milliLiter(s) (100 mL/Hr) IV Continuous <Continuous>  dextrose 50% Injectable 25 Gram(s) IV Push once  dextrose 50% Injectable 12.5 Gram(s) IV Push once  dextrose 50% Injectable 25 Gram(s) IV Push once  enoxaparin Injectable 40 milliGRAM(s) SubCutaneous <User Schedule>  FLUoxetine 20 milliGRAM(s) Oral daily  gabapentin 400 milliGRAM(s) Oral three times a day  glucagon  Injectable 1 milliGRAM(s) IntraMuscular once  insulin lispro (ADMELOG) corrective regimen sliding scale   SubCutaneous three times a day before meals  insulin lispro (ADMELOG) corrective regimen sliding scale   SubCutaneous at bedtime  lactulose Syrup 10 Gram(s) Oral two times a day  levothyroxine 75 MICROGram(s) Oral daily  losartan 100 milliGRAM(s) Oral daily  pantoprazole    Tablet 40 milliGRAM(s) Oral before breakfast  polyethylene glycol 3350 17 Gram(s) Oral two times a day  saline laxative (FLEET) Rectal Enema 1 Enema Rectal daily  senna 2 Tablet(s) Oral at bedtime  simvastatin 20 milliGRAM(s) Oral at bedtime    MEDICATIONS  (PRN):  acetaminophen     Tablet .. 650 milliGRAM(s) Oral every 6 hours PRN Temp greater or equal to 38C (100.4F), Moderate Pain (4 - 6)  bisacodyl Suppository 10 milliGRAM(s) Rectal daily PRN Constipation  dextrose Oral Gel 15 Gram(s) Oral once PRN Blood Glucose LESS THAN 70 milliGRAM(s)/deciliter  melatonin 6 milliGRAM(s) Oral at bedtime PRN Sleep  oxyCODONE    IR 5 milliGRAM(s) Oral every 6 hours PRN Severe Pain (7 - 10)      CAPILLARY BLOOD GLUCOSE      POCT Blood Glucose.: 174 mg/dL (2023 11:30)  POCT Blood Glucose.: 148 mg/dL (2023 07:23)  POCT Blood Glucose.: 160 mg/dL (2023 22:23)  POCT Blood Glucose.: 165 mg/dL (2023 16:31)    I&O's Summary    2023 07:01  -  2023 07:00  --------------------------------------------------------  IN: 0 mL / OUT: 2400 mL / NET: -2400 mL        PHYSICAL EXAM:    Vital Signs Last 24 Hrs  T(C): 37 (2023 14:03), Max: 37.3 (2023 09:59)  T(F): 98.6 (2023 14:03), Max: 99.2 (2023 09:59)  HR: 77 (2023 14:03) (66 - 97)  BP: 128/59 (2023 14:03) (128/59 - 151/55)  BP(mean): --  RR: 16 (2023 14:03) (16 - 18)  SpO2: 96% (2023 14:03) (94% - 100%)    Parameters below as of 2023 14:03  Patient On (Oxygen Delivery Method): room air    CONSTITUTIONAL: NAD, well-developed, well-groomed  EYES: Conjunctiva and sclera clear  RESPIRATORY: Normal respiratory effort; lungs are clear to auscultation bilaterally  CARDIOVASCULAR: Regular rate and rhythm, normal S1 and S2, no murmur/rub/gallop; No lower extremity edema  ABDOMEN: Soft, nontender to palpation, normoactive bowel sounds  MUSCULOSKELETAL: No clubbing or cyanosis of digits; no joint swelling or tenderness to palpation  PSYCH: A+O to person, place, and time; affect appropriate  NEUROLOGY: 3/5 strength lower extremities  SKIN: Old surgical scar on abdomen    LABS:                        9.0    9.53  )-----------( 306      ( 2023 05:49 )             29.0         135  |  99  |  21  ----------------------------<  144<H>  4.8   |  27  |  0.91    Ca    9.2      2023 05:49      Urinalysis Basic - ( 2023 11:41 )    Color: Light Yellow / Appearance: Turbid / S.010 / pH: x  Gluc: x / Ketone: Negative  / Bili: Negative / Urobili: <2 mg/dL   Blood: x / Protein: Negative / Nitrite: Negative   Leuk Esterase: Large / RBC: 5-10 /HPF / WBC 8 /HPF   Sq Epi: x / Non Sq Epi: 1 /HPF / Bacteria: Many      RADIOLOGY & ADDITIONAL TESTS:     < from: Transthoracic Echocardiogram (23 @ 08:08) >  CONCLUSIONS:  1. Mitral annular calcification, otherwise normal mitral  valve. Minimal mitral regurgitation.  2. Aortic valve not well visualized. Peak transaortic valve  gradient equals 18 mm Hg, mean transaortic valve gradient  equals 11 mm Hg, consistent with probable mild aortic  stenosis.  3. Endocardium not well visualized; grossly normal left  ventricular systolic function.  4. Mild diastolic dysfunction (Stage I).  5. The right ventricle is not well visualized; grossly  normal rightventricular systolic function.    Results Reviewed: Yes  Imaging Personally Reviewed:  Electrocardiogram Personally Reviewed:    COORDINATION OF CARE:  Care Discussed with Consultants/Other Providers [Y/N]: Yes- neurosurgery; will d/w family today regarding possible surgical intervention  Prior or Outpatient Records Reviewed [Y/N]: Yes

## 2023-01-05 LAB
ANION GAP SERPL CALC-SCNC: 10 MMOL/L — SIGNIFICANT CHANGE UP (ref 7–14)
BUN SERPL-MCNC: 22 MG/DL — SIGNIFICANT CHANGE UP (ref 7–23)
CALCIUM SERPL-MCNC: 9.4 MG/DL — SIGNIFICANT CHANGE UP (ref 8.4–10.5)
CHLORIDE SERPL-SCNC: 98 MMOL/L — SIGNIFICANT CHANGE UP (ref 98–107)
CO2 SERPL-SCNC: 26 MMOL/L — SIGNIFICANT CHANGE UP (ref 22–31)
CREAT SERPL-MCNC: 0.86 MG/DL — SIGNIFICANT CHANGE UP (ref 0.5–1.3)
EGFR: 69 ML/MIN/1.73M2 — SIGNIFICANT CHANGE UP
GLUCOSE BLDC GLUCOMTR-MCNC: 141 MG/DL — HIGH (ref 70–99)
GLUCOSE BLDC GLUCOMTR-MCNC: 142 MG/DL — HIGH (ref 70–99)
GLUCOSE BLDC GLUCOMTR-MCNC: 184 MG/DL — HIGH (ref 70–99)
GLUCOSE BLDC GLUCOMTR-MCNC: 213 MG/DL — HIGH (ref 70–99)
GLUCOSE SERPL-MCNC: 141 MG/DL — HIGH (ref 70–99)
HCT VFR BLD CALC: 29.6 % — LOW (ref 34.5–45)
HGB BLD-MCNC: 9.4 G/DL — LOW (ref 11.5–15.5)
MCHC RBC-ENTMCNC: 24.4 PG — LOW (ref 27–34)
MCHC RBC-ENTMCNC: 31.8 GM/DL — LOW (ref 32–36)
MCV RBC AUTO: 76.9 FL — LOW (ref 80–100)
NRBC # BLD: 0 /100 WBCS — SIGNIFICANT CHANGE UP (ref 0–0)
NRBC # FLD: 0 K/UL — SIGNIFICANT CHANGE UP (ref 0–0)
PLATELET # BLD AUTO: 310 K/UL — SIGNIFICANT CHANGE UP (ref 150–400)
POTASSIUM SERPL-MCNC: 4.2 MMOL/L — SIGNIFICANT CHANGE UP (ref 3.5–5.3)
POTASSIUM SERPL-SCNC: 4.2 MMOL/L — SIGNIFICANT CHANGE UP (ref 3.5–5.3)
RBC # BLD: 3.85 M/UL — SIGNIFICANT CHANGE UP (ref 3.8–5.2)
RBC # FLD: 15.3 % — HIGH (ref 10.3–14.5)
SODIUM SERPL-SCNC: 134 MMOL/L — LOW (ref 135–145)
WBC # BLD: 8.12 K/UL — SIGNIFICANT CHANGE UP (ref 3.8–10.5)
WBC # FLD AUTO: 8.12 K/UL — SIGNIFICANT CHANGE UP (ref 3.8–10.5)
ZINC SERPL-MCNC: 75 UG/DL — SIGNIFICANT CHANGE UP (ref 44–115)

## 2023-01-05 PROCEDURE — 99222 1ST HOSP IP/OBS MODERATE 55: CPT

## 2023-01-05 PROCEDURE — 99231 SBSQ HOSP IP/OBS SF/LOW 25: CPT

## 2023-01-05 PROCEDURE — 99232 SBSQ HOSP IP/OBS MODERATE 35: CPT

## 2023-01-05 RX ORDER — SUMATRIPTAN SUCCINATE 4 MG/.5ML
50 INJECTION, SOLUTION SUBCUTANEOUS DAILY
Refills: 0 | Status: DISCONTINUED | OUTPATIENT
Start: 2023-01-05 | End: 2023-01-09

## 2023-01-05 RX ADMIN — Medication 4: at 11:52

## 2023-01-05 RX ADMIN — Medication 650 MILLIGRAM(S): at 12:22

## 2023-01-05 RX ADMIN — Medication 10 MILLIGRAM(S): at 21:45

## 2023-01-05 RX ADMIN — GABAPENTIN 400 MILLIGRAM(S): 400 CAPSULE ORAL at 13:23

## 2023-01-05 RX ADMIN — Medication 75 MICROGRAM(S): at 06:49

## 2023-01-05 RX ADMIN — Medication 500 MILLIGRAM(S): at 06:49

## 2023-01-05 RX ADMIN — GABAPENTIN 400 MILLIGRAM(S): 400 CAPSULE ORAL at 06:49

## 2023-01-05 RX ADMIN — Medication 10 MILLIGRAM(S): at 13:23

## 2023-01-05 RX ADMIN — SIMVASTATIN 20 MILLIGRAM(S): 20 TABLET, FILM COATED ORAL at 21:45

## 2023-01-05 RX ADMIN — Medication 20 MILLIGRAM(S): at 05:21

## 2023-01-05 RX ADMIN — Medication 500 MILLIGRAM(S): at 18:16

## 2023-01-05 RX ADMIN — ENOXAPARIN SODIUM 40 MILLIGRAM(S): 100 INJECTION SUBCUTANEOUS at 21:45

## 2023-01-05 RX ADMIN — PANTOPRAZOLE SODIUM 40 MILLIGRAM(S): 20 TABLET, DELAYED RELEASE ORAL at 06:49

## 2023-01-05 RX ADMIN — LACTULOSE 10 GRAM(S): 10 SOLUTION ORAL at 06:48

## 2023-01-05 RX ADMIN — Medication 650 MILLIGRAM(S): at 11:52

## 2023-01-05 RX ADMIN — GABAPENTIN 400 MILLIGRAM(S): 400 CAPSULE ORAL at 21:45

## 2023-01-05 RX ADMIN — Medication 6 MILLIGRAM(S): at 23:29

## 2023-01-05 RX ADMIN — Medication 10 MILLIGRAM(S): at 06:49

## 2023-01-05 NOTE — PROGRESS NOTE ADULT - PROBLEM SELECTOR PLAN 7
Pt c/o dysuria, UA positive  -started on Cipro empirically  -f/u urine culture Pt c/o dysuria, UA positive  -started on Cipro empirically  -given ongoing dysuria, would broaden abx to Ceftriaxone 1g IV daily  -f/u urine culture- growing E. coli

## 2023-01-05 NOTE — PROGRESS NOTE ADULT - SUBJECTIVE AND OBJECTIVE BOX
Patient is a 77y old  Female who presents with a chief complaint of cervical, thoracic stenosis (05 Jan 2023 01:25)      HPI:  78 yo F PMH HTN, HLD, DM on metformin, hypothyroidism, depression, and sciatica presents with b/l lower extremity weakness. Pt reports she had a fall about 2 weeks ago and weakness began suddenly one week ago. She has been unable to get out of bed and walk at home. Pt states she has associated numbness, tingling, and pain 7/10 constant in her posterior thighs and lower legs. Pt took acetaminophen at home for pain which helped. Pt also endorses urinary incontinence and stool incontinence and chronic dysuria. Pt denies fever, chills, headache, CP, SOB, N/V/D, or constipation.    Does have lower back pain, non radicular    Patient also report to have ??ovarian cancer and had oopherectomy 2 years ago, unclear if chemo needed.     PMD is out of [place] Dr. Jorge L Hung from [place]  (30 Dec 2022 17:09)    Patient admitted with LE weakness and severe spinal stenosis, tentatively planned for OR tomorrow.     REVIEW OF SYSTEMS    PAST MEDICAL & SURGICAL HISTORY  HTN (hypertension)    HLD (hyperlipidemia)    Hypothyroidism    Major depression    DM (diabetes mellitus)    Sciatica    Colon cancer    No significant past surgical history    S/P cervical spinal fusion        SOCIAL HISTORY  Smoking - Denied  EtOH - Denied   Drugs - Denied    FUNCTIONAL HISTORY  Lives in apartment with roommate, 3 steps to enter, has hha 10 hrs per day  required assistance for ambulation, patient owns commode, rollator, R, shower chair and wheelchair    CURRENT FUNCTIONAL STATUS  1/4  Bed Mobility  Bed Mobility Training Rehab Potential: fair, will monitor progress closely  Bed Mobility Training Symptoms Noted During/After Treatment: none  Bed Mobility Training Rolling/Turning: minimum assist (75% patient effort);  verbal cues;  1 person assist;  bed rails  Bed Mobility Training Sit-to-Supine: minimum assist (75% patient effort);  2 person assist;  verbal cues  Bed Mobility Training Supine-to-Sit: minimum assist (75% patient effort);  1 person assist;  verbal cues;  head of bed elevated;  bed rails  Bed Mobility Training Limitations: decreased ability to use legs for bridging/pushing;  impaired ability to control trunk for mobility;  pain;  decreased strength    Sit-Stand Transfer Training  Sit-to-Stand Transfer Training Treatment not Performed: Pt attempted two person assist sit to stand however unable to perform secondary to pain and weakness.    Therapeutic Exercise  Therapeutic Exercise Detail: Pt performed bilateral seated knee extensions 1x10.  Pt educated on supine home exercise program (heel slides, hip abductions).       FAMILY HISTORY   No pertinent family history in first degree relatives      RECENT LABS/IMAGING  CBC Full  -  ( 05 Jan 2023 07:27 )  WBC Count : 8.12 K/uL  RBC Count : 3.85 M/uL  Hemoglobin : 9.4 g/dL  Hematocrit : 29.6 %  Platelet Count - Automated : 310 K/uL  Mean Cell Volume : 76.9 fL  Mean Cell Hemoglobin : 24.4 pg  Mean Cell Hemoglobin Concentration : 31.8 gm/dL  Auto Neutrophil # : x  Auto Lymphocyte # : x  Auto Monocyte # : x  Auto Eosinophil # : x  Auto Basophil # : x  Auto Neutrophil % : x  Auto Lymphocyte % : x  Auto Monocyte % : x  Auto Eosinophil % : x  Auto Basophil % : x    01-05    134<L>  |  98  |  22  ----------------------------<  141<H>  4.2   |  26  |  0.86    Ca    9.4      05 Jan 2023 07:27          VITALS  T(C): 37 (01-05-23 @ 10:10), Max: 37.2 (01-04-23 @ 17:29)  HR: 72 (01-05-23 @ 10:10) (60 - 77)  BP: 149/65 (01-05-23 @ 10:10) (128/59 - 149/65)  RR: 16 (01-05-23 @ 10:10) (16 - 18)  SpO2: 100% (01-05-23 @ 10:10) (94% - 100%)  Wt(kg): --    ALLERGIES  penicillins (Rash)      MEDICATIONS   acetaminophen     Tablet .. 650 milliGRAM(s) Oral every 6 hours PRN  baclofen 10 milliGRAM(s) Oral every 8 hours  bisacodyl Suppository 10 milliGRAM(s) Rectal daily PRN  ciprofloxacin     Tablet 500 milliGRAM(s) Oral every 12 hours  dextrose 5%. 1000 milliLiter(s) IV Continuous <Continuous>  dextrose 5%. 1000 milliLiter(s) IV Continuous <Continuous>  dextrose 50% Injectable 25 Gram(s) IV Push once  dextrose 50% Injectable 12.5 Gram(s) IV Push once  dextrose 50% Injectable 25 Gram(s) IV Push once  dextrose Oral Gel 15 Gram(s) Oral once PRN  enoxaparin Injectable 40 milliGRAM(s) SubCutaneous <User Schedule>  FLUoxetine 20 milliGRAM(s) Oral daily  gabapentin 400 milliGRAM(s) Oral three times a day  glucagon  Injectable 1 milliGRAM(s) IntraMuscular once  insulin lispro (ADMELOG) corrective regimen sliding scale   SubCutaneous three times a day before meals  insulin lispro (ADMELOG) corrective regimen sliding scale   SubCutaneous at bedtime  lactulose Syrup 10 Gram(s) Oral two times a day  levothyroxine 75 MICROGram(s) Oral daily  losartan 100 milliGRAM(s) Oral daily  melatonin 6 milliGRAM(s) Oral at bedtime PRN  oxyCODONE    IR 5 milliGRAM(s) Oral every 6 hours PRN  pantoprazole    Tablet 40 milliGRAM(s) Oral before breakfast  polyethylene glycol 3350 17 Gram(s) Oral two times a day  saline laxative (FLEET) Rectal Enema 1 Enema Rectal daily  senna 2 Tablet(s) Oral at bedtime  simvastatin 20 milliGRAM(s) Oral at bedtime  SUMAtriptan 50 milliGRAM(s) Oral daily PRN      ----------------------------------------------------------------------------------------  PHYSICAL EXAM  Constitutional - NAD, Comfortable  HEENT - NCAT, EOMI  Neck - Supple, No limited ROM  Chest - CTA bilaterally, No wheeze, No rhonchi, No crackles  Cardiovascular - RRR, S1S2, No murmurs  Abdomen - BS+, Soft, NTND  Extremities - No C/C/E, No calf tenderness   Neurologic Exam -                    Cognitive - Awake, Alert, AAO to self, place, date, year, situation     Communication - Fluent, No dysarthria     Cranial Nerves - CN 2-12 intact     Motor - No focal deficits                    LEFT    UE - ShAB 5/5, EF 5/5, EE 5/5, WE 5/5,  5/5                    RIGHT UE - ShAB 5/5, EF 5/5, EE 5/5, WE 5/5,  5/5                    LEFT    LE - HF 5/5, KE 5/5, DF 5/5, PF 5/5                    RIGHT LE - HF 5/5, KE 5/5, DF 5/5, PF 5/5        Sensory - Intact to LT     Reflexes - DTR Intact, No primitive reflexive     Coordination - FTN intact     OculoVestibular - No saccades, No nystagmus, VOR         Balance - WNL Static  Psychiatric - Mood stable, Affect WNL  ----------------------------------------------------------------------------------------  ASSESSMENT/PLAN    Pain -  DVT PPX -   Rehab -      incomplete note, consult in progress Patient is a 77y old  Female who presents with a chief complaint of cervical, thoracic stenosis (05 Jan 2023 01:25)      HPI:  78 yo F PMH HTN, HLD, DM on metformin, hypothyroidism, depression, and sciatica presents with b/l lower extremity weakness. Pt reports she had a fall about 2 weeks ago and weakness began suddenly one week ago. She has been unable to get out of bed and walk at home. Pt states she has associated numbness, tingling, and pain 7/10 constant in her posterior thighs and lower legs. Pt took acetaminophen at home for pain which helped. Pt also endorses urinary incontinence and stool incontinence and chronic dysuria. Pt denies fever, chills, headache, CP, SOB, N/V/D, or constipation.    Does have lower back pain, non radicular    Patient also report to have ??ovarian cancer and had oopherectomy 2 years ago, unclear if chemo needed.     PMD is out of [place] Dr. Jorge L Hung from [place]  (30 Dec 2022 17:09)    Patient admitted with LE weakness and severe spinal stenosis, tentatively planned for OR tomorrow.   reports she has not had radiating pain down the leg since shes been in the hospital.  Reports she fell prior to admission, and hurt her R ankle.  b/l foot drop, patient reports it is new.    REVIEW OF SYSTEMS  R>L ankle pain    PAST MEDICAL & SURGICAL HISTORY  HTN (hypertension)    HLD (hyperlipidemia)    Hypothyroidism    Major depression    DM (diabetes mellitus)    Sciatica    Colon cancer    No significant past surgical history    S/P cervical spinal fusion    SOCIAL HISTORY  Smoking - Denied  EtOH - Denied   Drugs - Denied    FUNCTIONAL HISTORY  Lives in apartment with roommate, 3 steps to enter, has hha 10 hrs per day  required assistance for ambulation, patient owns commode, rollator, R, shower chair and wheelchair    CURRENT FUNCTIONAL STATUS  1/4  Bed Mobility  Bed Mobility Training Rehab Potential: fair, will monitor progress closely  Bed Mobility Training Symptoms Noted During/After Treatment: none  Bed Mobility Training Rolling/Turning: minimum assist (75% patient effort);  verbal cues;  1 person assist;  bed rails  Bed Mobility Training Sit-to-Supine: minimum assist (75% patient effort);  2 person assist;  verbal cues  Bed Mobility Training Supine-to-Sit: minimum assist (75% patient effort);  1 person assist;  verbal cues;  head of bed elevated;  bed rails  Bed Mobility Training Limitations: decreased ability to use legs for bridging/pushing;  impaired ability to control trunk for mobility;  pain;  decreased strength    Sit-Stand Transfer Training  Sit-to-Stand Transfer Training Treatment not Performed: Pt attempted two person assist sit to stand however unable to perform secondary to pain and weakness.    Therapeutic Exercise  Therapeutic Exercise Detail: Pt performed bilateral seated knee extensions 1x10.  Pt educated on supine home exercise program (heel slides, hip abductions).       FAMILY HISTORY   No pertinent family history in first degree relatives      RECENT LABS/IMAGING  CBC Full  -  ( 05 Jan 2023 07:27 )  WBC Count : 8.12 K/uL  RBC Count : 3.85 M/uL  Hemoglobin : 9.4 g/dL  Hematocrit : 29.6 %  Platelet Count - Automated : 310 K/uL  Mean Cell Volume : 76.9 fL  Mean Cell Hemoglobin : 24.4 pg  Mean Cell Hemoglobin Concentration : 31.8 gm/dL  Auto Neutrophil # : x  Auto Lymphocyte # : x  Auto Monocyte # : x  Auto Eosinophil # : x  Auto Basophil # : x  Auto Neutrophil % : x  Auto Lymphocyte % : x  Auto Monocyte % : x  Auto Eosinophil % : x  Auto Basophil % : x    01-05    134<L>  |  98  |  22  ----------------------------<  141<H>  4.2   |  26  |  0.86    Ca    9.4      05 Jan 2023 07:27          VITALS  T(C): 37 (01-05-23 @ 10:10), Max: 37.2 (01-04-23 @ 17:29)  HR: 72 (01-05-23 @ 10:10) (60 - 77)  BP: 149/65 (01-05-23 @ 10:10) (128/59 - 149/65)  RR: 16 (01-05-23 @ 10:10) (16 - 18)  SpO2: 100% (01-05-23 @ 10:10) (94% - 100%)  Wt(kg): --    ALLERGIES  penicillins (Rash)      MEDICATIONS   acetaminophen     Tablet .. 650 milliGRAM(s) Oral every 6 hours PRN  baclofen 10 milliGRAM(s) Oral every 8 hours  bisacodyl Suppository 10 milliGRAM(s) Rectal daily PRN  ciprofloxacin     Tablet 500 milliGRAM(s) Oral every 12 hours  dextrose 5%. 1000 milliLiter(s) IV Continuous <Continuous>  dextrose 5%. 1000 milliLiter(s) IV Continuous <Continuous>  dextrose 50% Injectable 25 Gram(s) IV Push once  dextrose 50% Injectable 12.5 Gram(s) IV Push once  dextrose 50% Injectable 25 Gram(s) IV Push once  dextrose Oral Gel 15 Gram(s) Oral once PRN  enoxaparin Injectable 40 milliGRAM(s) SubCutaneous <User Schedule>  FLUoxetine 20 milliGRAM(s) Oral daily  gabapentin 400 milliGRAM(s) Oral three times a day  glucagon  Injectable 1 milliGRAM(s) IntraMuscular once  insulin lispro (ADMELOG) corrective regimen sliding scale   SubCutaneous three times a day before meals  insulin lispro (ADMELOG) corrective regimen sliding scale   SubCutaneous at bedtime  lactulose Syrup 10 Gram(s) Oral two times a day  levothyroxine 75 MICROGram(s) Oral daily  losartan 100 milliGRAM(s) Oral daily  melatonin 6 milliGRAM(s) Oral at bedtime PRN  oxyCODONE    IR 5 milliGRAM(s) Oral every 6 hours PRN  pantoprazole    Tablet 40 milliGRAM(s) Oral before breakfast  polyethylene glycol 3350 17 Gram(s) Oral two times a day  saline laxative (FLEET) Rectal Enema 1 Enema Rectal daily  senna 2 Tablet(s) Oral at bedtime  simvastatin 20 milliGRAM(s) Oral at bedtime  SUMAtriptan 50 milliGRAM(s) Oral daily PRN      ----------------------------------------------------------------------------------------  PHYSICAL EXAM  Constitutional - NAD, Comfortable  HEENT - NCAT, EOMI  Neck - Supple, No limited ROM  Chest - no respiratory distress  Cardiovascular - RRR, S1S2   Abdomen - BS+, Soft, NTND  Extremities - No C/C/E, No calf tenderness   Neurologic Exam -                    Cognitive - Awake, Alert, AAO to self, place, date, year, situation     Communication - Fluent, No dysarthria     Cranial Nerves - CN 2-12 intact     Motor -                      LEFT    UE - ShAB 5/5, EF 5/5, EE 5/5, WE 5/5,  5/5                    RIGHT UE - ShAB 5/5, EF 5/5, EE 5/5, WE 5/5,  5/5                    LEFT    LE - HF 5/5, KE 5/5, DF 0/5, PF 0/5                    RIGHT LE - HF 5/5, KE 5/5, DF 0/5, PF 0/5        Sensory - impaired to LT b/l LE     Balance - WNL Static  Psychiatric - Mood stable, Affect WNL    ----------------------------------------------------------------------------------------  ASSESSMENT/PLAN  78 yo F PMH HTN, HLD, DM on metformin, hypothyroidism, depression, and sciatica presents with b/l lower extremity weakness with MRI findings of severe spinal stenosis at L3-4, L4-5, degenerative changes and stenosis  patient reports b/l R>L ankle pain, worse after fall, consider x ray   cipro for uti  on imitrex for migraine  Pain -tylenol, gabapentin, baclofen, oxy ir prn  DVT PPX - lovenox  Rehab -   recommend subacute rehab when medically cleared

## 2023-01-05 NOTE — PROGRESS NOTE ADULT - SUBJECTIVE AND OBJECTIVE BOX
Vital Signs Last 24 Hrs  T(C): 36.9 (2023 21:25), Max: 37.3 (2023 09:59)  T(F): 98.4 (2023 21:25), Max: 99.2 (2023 09:59)  HR: 70 (2023 21:25) (66 - 97)  BP: 138/61 (2023 21:25) (128/59 - 151/55)  BP(mean): --  RR: 18 (2023 21:25) (16 - 18)  SpO2: 94% (2023 21:25) (94% - 100%)    Parameters below as of 2023 21:25  Patient On (Oxygen Delivery Method): room air    78 yo F PMH HTN, HLD, DM on metformin, hypothyroidism, depression, and sciatica presents with b/l lower extremity weakness. Pt reports she had a fall about 2 weeks ago and weakness began suddenly one week ago. She has been unable to get out of bed and walk at home. Pt states she has associated numbness, tingling, and pain 7/10 constant in her posterior thighs and lower legs. Pt took acetaminophen at home for pain which helped. Pt also endorses urinary incontinence and stool incontinence and chronic dysuria. Pt denies fever, chills, headache, CP, SOB, N/V/D, or constipation.  Does have lower back pain, non radicular  Patient also report to have ovarian cancer and had oopherectomy 2 years ago, unclear if chemo needed.   PMD is out of [place] Dr. Jorge L Hung from [place]     PAST MEDICAL & SURGICAL HISTORY:  HTN (hypertension)  HLD (hyperlipidemia)  Hypothyroidism  Major depression  DM (diabetes mellitus)  Sciatica  No significant past surgical history    No issues overnight    AAO X 3  PERRLA, EOMI  Face symmetric   Motor: Bilateral UE 5/5  RLE: HF 2/5, KE 3/5, DF/PF 0/5  LLE: HF 3/5, KE 3/5, DF/PF 1/5  no clonus    MEDICATIONS  (STANDING):  baclofen 10 milliGRAM(s) Oral every 8 hours  ciprofloxacin     Tablet 500 milliGRAM(s) Oral every 12 hours  dextrose 5%. 1000 milliLiter(s) (50 mL/Hr) IV Continuous <Continuous>  dextrose 5%. 1000 milliLiter(s) (100 mL/Hr) IV Continuous <Continuous>  dextrose 50% Injectable 25 Gram(s) IV Push once  dextrose 50% Injectable 12.5 Gram(s) IV Push once  dextrose 50% Injectable 25 Gram(s) IV Push once  enoxaparin Injectable 40 milliGRAM(s) SubCutaneous <User Schedule>  FLUoxetine 20 milliGRAM(s) Oral daily  gabapentin 400 milliGRAM(s) Oral three times a day  glucagon  Injectable 1 milliGRAM(s) IntraMuscular once  insulin lispro (ADMELOG) corrective regimen sliding scale   SubCutaneous three times a day before meals  insulin lispro (ADMELOG) corrective regimen sliding scale   SubCutaneous at bedtime  lactulose Syrup 10 Gram(s) Oral two times a day  levothyroxine 75 MICROGram(s) Oral daily  losartan 100 milliGRAM(s) Oral daily  pantoprazole    Tablet 40 milliGRAM(s) Oral before breakfast  polyethylene glycol 3350 17 Gram(s) Oral two times a day  saline laxative (FLEET) Rectal Enema 1 Enema Rectal daily  senna 2 Tablet(s) Oral at bedtime  simvastatin 20 milliGRAM(s) Oral at bedtime    MEDICATIONS  (PRN):  acetaminophen     Tablet .. 650 milliGRAM(s) Oral every 6 hours PRN Temp greater or equal to 38C (100.4F), Moderate Pain (4 - 6)  bisacodyl Suppository 10 milliGRAM(s) Rectal daily PRN Constipation  dextrose Oral Gel 15 Gram(s) Oral once PRN Blood Glucose LESS THAN 70 milliGRAM(s)/deciliter  melatonin 6 milliGRAM(s) Oral at bedtime PRN Sleep  oxyCODONE    IR 5 milliGRAM(s) Oral every 6 hours PRN Severe Pain (7 - 10)    Urinalysis Basic - ( 2023 11:41 )    Color: Light Yellow / Appearance: Turbid / S.010 / pH: x  Gluc: x / Ketone: Negative  / Bili: Negative / Urobili: <2 mg/dL   Urine Microscopic-Add On (NC) (23 @ 11:41)    Red Blood Cell - Urine: 5-10 /HPF    White Blood Cell - Urine: 8 /HPF    Hyaline Casts: 0 /LPF    Bacteria: Many    Epithelial Cells: 1 /HPF    Blood: x / Protein: Negative / Nitrite: Negative   Leuk Esterase: Large / RBC: 5-10 /HPF / WBC 8 /HPF   Sq Epi: x / Non Sq Epi: 1 /HPF / Bacteria: Many

## 2023-01-05 NOTE — CHART NOTE - NSCHARTNOTEFT_GEN_A_CORE
oncology chart note     Onc fellow called Pt's Daughter Karla cell [phone].   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 Thereafter pt follow up with Med onc Randy Erickson at [facility] ([phone]). Recent office visit around 8/2022, and she missed an appointment in 12/2022. To be noted she will have a f/u appointment with Med onc on 1/17/23 per daughter.     Onc fellow called Dr Andrews's office in the afternoon, however no one picked up the phone, and no one returned call after left a message.     Daughter denied any chemo or radiation post-surgery, and she mentioned that pt currently is under surveillance only. Last visit CEA slightly increased per daughter (details unknown). Daughter was informed that CEA level 35 during this admission.    Daughter also reported that she talked with neurosurgery team today, and they decide that pt will not have surgery, and pt will be d/c likely to rehab.     Onc fellow recommended that pt should f/u with her med onc for further eval and monitoring. Also discussed with daughter about CT abd result during this admission. She understands the situation, and she agrees with the plan for following up as outpt with med onc Dr. Andrews. Also encouraged daughter to communicate with primary team about their concerns, questions, and placement plan. Continue further discussion among patient family daughter, primary team, and neurosurgery team.     Onc team will sign off. Please contact with onc team if having more questions.     Nell Larson PGY5   Hem & Onc fellow [phone]

## 2023-01-05 NOTE — PROGRESS NOTE ADULT - PROBLEM SELECTOR PLAN 1
-CXR and EKG reviewed   -difficult to assess METs as patient has not been ambulating much since her fall and much for the past 2 years  -TTE reviewed- grossly normal LV systolic function, grade 1 diastolic dysfunction  -LE dopplers negative for DVT  -no plan for surgery at this time given poor baseline functional status, pt's family deferring surgery  -?history of colon cancer, primary team to obtain records  -CT Chest, Abd and Pelvis reviewed  -seen by oncology

## 2023-01-06 DIAGNOSIS — M48.061 SPINAL STENOSIS, LUMBAR REGION WITHOUT NEUROGENIC CLAUDICATION: ICD-10-CM

## 2023-01-06 LAB
-  AMIKACIN: SIGNIFICANT CHANGE UP
-  AMOXICILLIN/CLAVULANIC ACID: SIGNIFICANT CHANGE UP
-  AMPICILLIN/SULBACTAM: SIGNIFICANT CHANGE UP
-  AMPICILLIN: SIGNIFICANT CHANGE UP
-  AZTREONAM: SIGNIFICANT CHANGE UP
-  CEFAZOLIN: SIGNIFICANT CHANGE UP
-  CEFEPIME: SIGNIFICANT CHANGE UP
-  CEFOXITIN: SIGNIFICANT CHANGE UP
-  CEFTRIAXONE: SIGNIFICANT CHANGE UP
-  CEFUROXIME: SIGNIFICANT CHANGE UP
-  CIPROFLOXACIN: SIGNIFICANT CHANGE UP
-  ERTAPENEM: SIGNIFICANT CHANGE UP
-  GENTAMICIN: SIGNIFICANT CHANGE UP
-  IMIPENEM: SIGNIFICANT CHANGE UP
-  LEVOFLOXACIN: SIGNIFICANT CHANGE UP
-  MEROPENEM: SIGNIFICANT CHANGE UP
-  NITROFURANTOIN: SIGNIFICANT CHANGE UP
-  PIPERACILLIN/TAZOBACTAM: SIGNIFICANT CHANGE UP
-  TOBRAMYCIN: SIGNIFICANT CHANGE UP
-  TRIMETHOPRIM/SULFAMETHOXAZOLE: SIGNIFICANT CHANGE UP
ANION GAP SERPL CALC-SCNC: 13 MMOL/L — SIGNIFICANT CHANGE UP (ref 7–14)
BUN SERPL-MCNC: 25 MG/DL — HIGH (ref 7–23)
CALCIUM SERPL-MCNC: 9.8 MG/DL — SIGNIFICANT CHANGE UP (ref 8.4–10.5)
CHLORIDE SERPL-SCNC: 99 MMOL/L — SIGNIFICANT CHANGE UP (ref 98–107)
CO2 SERPL-SCNC: 25 MMOL/L — SIGNIFICANT CHANGE UP (ref 22–31)
CREAT SERPL-MCNC: 0.96 MG/DL — SIGNIFICANT CHANGE UP (ref 0.5–1.3)
CULTURE RESULTS: SIGNIFICANT CHANGE UP
EGFR: 60 ML/MIN/1.73M2 — SIGNIFICANT CHANGE UP
GLUCOSE BLDC GLUCOMTR-MCNC: 133 MG/DL — HIGH (ref 70–99)
GLUCOSE BLDC GLUCOMTR-MCNC: 157 MG/DL — HIGH (ref 70–99)
GLUCOSE BLDC GLUCOMTR-MCNC: 195 MG/DL — HIGH (ref 70–99)
GLUCOSE BLDC GLUCOMTR-MCNC: 241 MG/DL — HIGH (ref 70–99)
GLUCOSE BLDC GLUCOMTR-MCNC: 249 MG/DL — HIGH (ref 70–99)
GLUCOSE SERPL-MCNC: 160 MG/DL — HIGH (ref 70–99)
HCT VFR BLD CALC: 31.7 % — LOW (ref 34.5–45)
HGB BLD-MCNC: 9.9 G/DL — LOW (ref 11.5–15.5)
MCHC RBC-ENTMCNC: 24 PG — LOW (ref 27–34)
MCHC RBC-ENTMCNC: 31.2 GM/DL — LOW (ref 32–36)
MCV RBC AUTO: 76.9 FL — LOW (ref 80–100)
METHOD TYPE: SIGNIFICANT CHANGE UP
NRBC # BLD: 0 /100 WBCS — SIGNIFICANT CHANGE UP (ref 0–0)
NRBC # FLD: 0 K/UL — SIGNIFICANT CHANGE UP (ref 0–0)
ORGANISM # SPEC MICROSCOPIC CNT: SIGNIFICANT CHANGE UP
ORGANISM # SPEC MICROSCOPIC CNT: SIGNIFICANT CHANGE UP
PLATELET # BLD AUTO: 312 K/UL — SIGNIFICANT CHANGE UP (ref 150–400)
POTASSIUM SERPL-MCNC: 5.1 MMOL/L — SIGNIFICANT CHANGE UP (ref 3.5–5.3)
POTASSIUM SERPL-SCNC: 5.1 MMOL/L — SIGNIFICANT CHANGE UP (ref 3.5–5.3)
RBC # BLD: 4.12 M/UL — SIGNIFICANT CHANGE UP (ref 3.8–5.2)
RBC # FLD: 15.3 % — HIGH (ref 10.3–14.5)
SODIUM SERPL-SCNC: 137 MMOL/L — SIGNIFICANT CHANGE UP (ref 135–145)
SPECIMEN SOURCE: SIGNIFICANT CHANGE UP
WBC # BLD: 9.36 K/UL — SIGNIFICANT CHANGE UP (ref 3.8–10.5)
WBC # FLD AUTO: 9.36 K/UL — SIGNIFICANT CHANGE UP (ref 3.8–10.5)

## 2023-01-06 PROCEDURE — 99232 SBSQ HOSP IP/OBS MODERATE 35: CPT

## 2023-01-06 PROCEDURE — 93970 EXTREMITY STUDY: CPT | Mod: 26

## 2023-01-06 PROCEDURE — 99231 SBSQ HOSP IP/OBS SF/LOW 25: CPT

## 2023-01-06 RX ORDER — NITROFURANTOIN MACROCRYSTAL 50 MG
100 CAPSULE ORAL
Refills: 0 | Status: DISCONTINUED | OUTPATIENT
Start: 2023-01-06 | End: 2023-01-08

## 2023-01-06 RX ADMIN — Medication 100 MILLIGRAM(S): at 19:31

## 2023-01-06 RX ADMIN — GABAPENTIN 400 MILLIGRAM(S): 400 CAPSULE ORAL at 05:50

## 2023-01-06 RX ADMIN — Medication 100 MILLIGRAM(S): at 17:25

## 2023-01-06 RX ADMIN — ENOXAPARIN SODIUM 40 MILLIGRAM(S): 100 INJECTION SUBCUTANEOUS at 21:04

## 2023-01-06 RX ADMIN — Medication 75 MICROGRAM(S): at 05:50

## 2023-01-06 RX ADMIN — Medication 10 MILLIGRAM(S): at 05:50

## 2023-01-06 RX ADMIN — Medication 2: at 17:25

## 2023-01-06 RX ADMIN — Medication 650 MILLIGRAM(S): at 21:03

## 2023-01-06 RX ADMIN — LOSARTAN POTASSIUM 100 MILLIGRAM(S): 100 TABLET, FILM COATED ORAL at 05:50

## 2023-01-06 RX ADMIN — Medication 20 MILLIGRAM(S): at 05:50

## 2023-01-06 RX ADMIN — GABAPENTIN 400 MILLIGRAM(S): 400 CAPSULE ORAL at 21:04

## 2023-01-06 RX ADMIN — GABAPENTIN 400 MILLIGRAM(S): 400 CAPSULE ORAL at 13:44

## 2023-01-06 RX ADMIN — Medication 500 MILLIGRAM(S): at 05:51

## 2023-01-06 RX ADMIN — PANTOPRAZOLE SODIUM 40 MILLIGRAM(S): 20 TABLET, DELAYED RELEASE ORAL at 05:51

## 2023-01-06 RX ADMIN — Medication 650 MILLIGRAM(S): at 21:55

## 2023-01-06 RX ADMIN — Medication 4: at 11:37

## 2023-01-06 RX ADMIN — Medication 10 MILLIGRAM(S): at 21:04

## 2023-01-06 RX ADMIN — Medication 6 MILLIGRAM(S): at 23:52

## 2023-01-06 RX ADMIN — SIMVASTATIN 20 MILLIGRAM(S): 20 TABLET, FILM COATED ORAL at 21:04

## 2023-01-06 RX ADMIN — Medication 10 MILLIGRAM(S): at 13:43

## 2023-01-06 NOTE — PROGRESS NOTE ADULT - SUBJECTIVE AND OBJECTIVE BOX
Debbie Avalos  [facility]  Pager #[phone]  Available on [employer] Teams    Patient is a 77y old  Female who presents with a chief complaint of cervical, thoracic stenosis (06 Jan 2023 02:38)      SUBJECTIVE / OVERNIGHT EVENTS: Patient seen and examined at bedside. Pt still c/o dysuria and feeling tired.    ADDITIONAL REVIEW OF SYSTEMS:    MEDICATIONS  (STANDING):  baclofen 10 milliGRAM(s) Oral every 8 hours  dextrose 5%. 1000 milliLiter(s) (50 mL/Hr) IV Continuous <Continuous>  dextrose 5%. 1000 milliLiter(s) (100 mL/Hr) IV Continuous <Continuous>  dextrose 50% Injectable 25 Gram(s) IV Push once  dextrose 50% Injectable 12.5 Gram(s) IV Push once  dextrose 50% Injectable 25 Gram(s) IV Push once  enoxaparin Injectable 40 milliGRAM(s) SubCutaneous <User Schedule>  FLUoxetine 20 milliGRAM(s) Oral daily  gabapentin 400 milliGRAM(s) Oral three times a day  glucagon  Injectable 1 milliGRAM(s) IntraMuscular once  insulin lispro (ADMELOG) corrective regimen sliding scale   SubCutaneous three times a day before meals  insulin lispro (ADMELOG) corrective regimen sliding scale   SubCutaneous at bedtime  lactulose Syrup 10 Gram(s) Oral two times a day  levothyroxine 75 MICROGram(s) Oral daily  losartan 100 milliGRAM(s) Oral daily  nitrofurantoin monohydrate/macrocrystals (MACROBID) 100 milliGRAM(s) Oral two times a day  pantoprazole    Tablet 40 milliGRAM(s) Oral before breakfast  polyethylene glycol 3350 17 Gram(s) Oral two times a day  saline laxative (FLEET) Rectal Enema 1 Enema Rectal daily  senna 2 Tablet(s) Oral at bedtime  simvastatin 20 milliGRAM(s) Oral at bedtime    MEDICATIONS  (PRN):  acetaminophen     Tablet .. 650 milliGRAM(s) Oral every 6 hours PRN Temp greater or equal to 38C (100.4F), Moderate Pain (4 - 6)  bisacodyl Suppository 10 milliGRAM(s) Rectal daily PRN Constipation  dextrose Oral Gel 15 Gram(s) Oral once PRN Blood Glucose LESS THAN 70 milliGRAM(s)/deciliter  melatonin 6 milliGRAM(s) Oral at bedtime PRN Sleep  oxyCODONE    IR 5 milliGRAM(s) Oral every 6 hours PRN Severe Pain (7 - 10)  SUMAtriptan 50 milliGRAM(s) Oral daily PRN Migraine      CAPILLARY BLOOD GLUCOSE      POCT Blood Glucose.: 241 mg/dL (06 Jan 2023 11:03)  POCT Blood Glucose.: 249 mg/dL (06 Jan 2023 09:31)  POCT Blood Glucose.: 133 mg/dL (06 Jan 2023 07:05)  POCT Blood Glucose.: 184 mg/dL (05 Jan 2023 21:21)  POCT Blood Glucose.: 142 mg/dL (05 Jan 2023 16:32)    I&O's Summary    05 Jan 2023 07:01  -  06 Jan 2023 07:00  --------------------------------------------------------  IN: 0 mL / OUT: 2300 mL / NET: -2300 mL        PHYSICAL EXAM:    Vital Signs Last 24 Hrs  T(C): 36.8 (06 Jan 2023 09:33), Max: 37.6 (05 Jan 2023 21:15)  T(F): 98.3 (06 Jan 2023 09:33), Max: 99.7 (05 Jan 2023 21:15)  HR: 70 (06 Jan 2023 09:33) (69 - 75)  BP: 134/58 (06 Jan 2023 09:33) (132/55 - 145/62)  BP(mean): --  RR: 18 (06 Jan 2023 09:33) (18 - 18)  SpO2: 95% (06 Jan 2023 09:33) (91% - 98%)    Parameters below as of 06 Jan 2023 09:33  Patient On (Oxygen Delivery Method): room air    CONSTITUTIONAL: NAD, well-developed, well-groomed  EYES: Conjunctiva and sclera clear  RESPIRATORY: Normal respiratory effort; lungs are clear to auscultation bilaterally  CARDIOVASCULAR: Regular rate and rhythm, normal S1 and S2, no murmur/rub/gallop; No lower extremity edema  ABDOMEN: Soft, nontender to palpation, normoactive bowel sounds  PSYCH: A+O to person, place  NEUROLOGY: 3/5 strength lower extremities  SKIN: No rashes    LABS:                        9.9    9.36  )-----------( 312      ( 06 Jan 2023 05:08 )             31.7     01-06    137  |  99  |  25<H>  ----------------------------<  160<H>  5.1   |  25  |  0.96    Ca    9.8      06 Jan 2023 05:08    Culture - Urine (collected 03 Jan 2023 23:27)  Source: Clean Catch Clean Catch (Midstream)  Final Report (06 Jan 2023 08:57):    >100,000 CFU/ml Escherichia coli  Organism: Escherichia coli (06 Jan 2023 08:57)  Organism: Escherichia coli (06 Jan 2023 08:57)      RADIOLOGY & ADDITIONAL TESTS:     < from: US Duplex Venous Lower Ext Complete, Bilateral (01.06.23 @ 10:32) >  FINDINGS:    RIGHT:  Normal compressibility of the RIGHT common femoral, femoral and popliteal   veins.  Doppler examination shows normal spontaneous and phasic flow.  No RIGHT calf vein thrombosis is detected.    LEFT:  Normal compressibility of the LEFT common femoral, femoraland popliteal   veins.  Doppler examination shows normal spontaneous and phasic flow.  No LEFT calf vein thrombosis is detected.    IMPRESSION:  No evidence of deep venous thrombosis in either lower extremity.    Results Reviewed: Yes  Imaging Personally Reviewed:  Electrocardiogram Personally Reviewed:    COORDINATION OF CARE:  Care Discussed with Consultants/Other Providers [Y/N]: Yes, neurosurgery- d/c planning, switch cipro to Macrobid based on urine culture results  Prior or Outpatient Records Reviewed [Y/N]:

## 2023-01-06 NOTE — PROGRESS NOTE ADULT - SUBJECTIVE AND OBJECTIVE BOX
78 yo F PMH HTN, HLD, DM on metformin, hypothyroidism, depression, and sciatica presents with b/l lower extremity weakness. Pt reports she had a fall about 2 weeks ago and weakness began suddenly one week ago. She has been unable to get out of bed and walk at home. Pt states she has associated numbness, tingling, and pain 7/10 constant in her posterior thighs and lower legs. Pt took acetaminophen at home for pain which helped. Pt also endorses urinary incontinence and stool incontinence and chronic dysuria. Pt denies fever, chills, headache, CP, SOB, N/V/D, or constipation.  Does have lower back pain, non radicular  Patient also report to have ovarian cancer and had oopherectomy 2 years ago, unclear if chemo needed.   PMD is out of [place] Dr. Jorge L Hung from [place]     PAST MEDICAL & SURGICAL HISTORY:  HTN (hypertension)  HLD (hyperlipidemia)  Hypothyroidism  Major depression  DM (diabetes mellitus)  Sciatica  No significant past surgical history    Patient C/O bilateral foot pain, describing it as "electric shocks", refusing to wear intermittent compression stockongs  Patient states she has has similar pain intermittently for months    Vital Signs Last 24 Hrs  T(C): 37.1 (06 Jan 2023 01:29), Max: 37.6 (05 Jan 2023 21:15)  T(F): 98.8 (06 Jan 2023 01:29), Max: 99.7 (05 Jan 2023 21:15)  HR: 73 (06 Jan 2023 01:29) (60 - 75)  BP: 139/68 (06 Jan 2023 01:29) (132/55 - 149/65)  BP(mean): --  RR: 18 (06 Jan 2023 01:29) (16 - 18)  SpO2: 91% (06 Jan 2023 01:29) (91% - 100%)    Parameters below as of 06 Jan 2023 01:29  Patient On (Oxygen Delivery Method): room air    AAO X 3  PERRLA, EOMI  Face symmetric   Motor: Bilateral UE 5/5  RLE: HF 2/5, KE 3/5, DF/PF 0/5  LLE: HF 3/5, KE 3/5, DF/PF 1/5  Bilateral feet tender on dorsal aspect, skin warm, normal color, pulses 2+ bilaterally  Calves soft, nontender bilaterally, no warmth or cords    MEDICATIONS  (STANDING):  baclofen 10 milliGRAM(s) Oral every 8 hours  ciprofloxacin     Tablet 500 milliGRAM(s) Oral every 12 hours  dextrose 5%. 1000 milliLiter(s) (100 mL/Hr) IV Continuous <Continuous>  dextrose 5%. 1000 milliLiter(s) (50 mL/Hr) IV Continuous <Continuous>  dextrose 50% Injectable 25 Gram(s) IV Push once  dextrose 50% Injectable 12.5 Gram(s) IV Push once  dextrose 50% Injectable 25 Gram(s) IV Push once  enoxaparin Injectable 40 milliGRAM(s) SubCutaneous <User Schedule>  FLUoxetine 20 milliGRAM(s) Oral daily  gabapentin 400 milliGRAM(s) Oral three times a day  glucagon  Injectable 1 milliGRAM(s) IntraMuscular once  insulin lispro (ADMELOG) corrective regimen sliding scale   SubCutaneous three times a day before meals  insulin lispro (ADMELOG) corrective regimen sliding scale   SubCutaneous at bedtime  lactulose Syrup 10 Gram(s) Oral two times a day  levothyroxine 75 MICROGram(s) Oral daily  losartan 100 milliGRAM(s) Oral daily  pantoprazole    Tablet 40 milliGRAM(s) Oral before breakfast  polyethylene glycol 3350 17 Gram(s) Oral two times a day  saline laxative (FLEET) Rectal Enema 1 Enema Rectal daily  senna 2 Tablet(s) Oral at bedtime  simvastatin 20 milliGRAM(s) Oral at bedtime    MEDICATIONS  (PRN):  acetaminophen     Tablet .. 650 milliGRAM(s) Oral every 6 hours PRN Temp greater or equal to 38C (100.4F), Moderate Pain (4 - 6)  bisacodyl Suppository 10 milliGRAM(s) Rectal daily PRN Constipation  dextrose Oral Gel 15 Gram(s) Oral once PRN Blood Glucose LESS THAN 70 milliGRAM(s)/deciliter  melatonin 6 milliGRAM(s) Oral at bedtime PRN Sleep  oxyCODONE    IR 5 milliGRAM(s) Oral every 6 hours PRN Severe Pain (7 - 10)  SUMAtriptan 50 milliGRAM(s) Oral daily PRN Migraine

## 2023-01-06 NOTE — PROGRESS NOTE ADULT - PROBLEM SELECTOR PLAN 1
Severe spinal stenosis of L3-L4 and L4-L5 noted on MRI  -initial plan for surgery, however at this time given poor baseline functional status, pt's family is deferring surgery  -?history of colon or ovarian cancer, primary team to obtain records  -CT Chest, Abd and Pelvis reviewed  -seen by oncology

## 2023-01-07 LAB
ANION GAP SERPL CALC-SCNC: 15 MMOL/L — HIGH (ref 7–14)
B PERT DNA SPEC QL NAA+PROBE: SIGNIFICANT CHANGE UP
B PERT+PARAPERT DNA PNL SPEC NAA+PROBE: SIGNIFICANT CHANGE UP
BORDETELLA PARAPERTUSSIS (RAPRVP): SIGNIFICANT CHANGE UP
BUN SERPL-MCNC: 28 MG/DL — HIGH (ref 7–23)
C PNEUM DNA SPEC QL NAA+PROBE: SIGNIFICANT CHANGE UP
CALCIUM SERPL-MCNC: 10 MG/DL — SIGNIFICANT CHANGE UP (ref 8.4–10.5)
CHLORIDE SERPL-SCNC: 98 MMOL/L — SIGNIFICANT CHANGE UP (ref 98–107)
CO2 SERPL-SCNC: 22 MMOL/L — SIGNIFICANT CHANGE UP (ref 22–31)
CREAT SERPL-MCNC: 0.89 MG/DL — SIGNIFICANT CHANGE UP (ref 0.5–1.3)
EGFR: 66 ML/MIN/1.73M2 — SIGNIFICANT CHANGE UP
FLUAV SUBTYP SPEC NAA+PROBE: SIGNIFICANT CHANGE UP
FLUBV RNA SPEC QL NAA+PROBE: SIGNIFICANT CHANGE UP
GLUCOSE BLDC GLUCOMTR-MCNC: 120 MG/DL — HIGH (ref 70–99)
GLUCOSE BLDC GLUCOMTR-MCNC: 167 MG/DL — HIGH (ref 70–99)
GLUCOSE BLDC GLUCOMTR-MCNC: 263 MG/DL — HIGH (ref 70–99)
GLUCOSE BLDC GLUCOMTR-MCNC: 318 MG/DL — HIGH (ref 70–99)
GLUCOSE SERPL-MCNC: 187 MG/DL — HIGH (ref 70–99)
HADV DNA SPEC QL NAA+PROBE: SIGNIFICANT CHANGE UP
HCOV 229E RNA SPEC QL NAA+PROBE: SIGNIFICANT CHANGE UP
HCOV HKU1 RNA SPEC QL NAA+PROBE: SIGNIFICANT CHANGE UP
HCOV NL63 RNA SPEC QL NAA+PROBE: SIGNIFICANT CHANGE UP
HCOV OC43 RNA SPEC QL NAA+PROBE: SIGNIFICANT CHANGE UP
HCT VFR BLD CALC: 34 % — LOW (ref 34.5–45)
HGB BLD-MCNC: 10.6 G/DL — LOW (ref 11.5–15.5)
HMPV RNA SPEC QL NAA+PROBE: SIGNIFICANT CHANGE UP
HPIV1 RNA SPEC QL NAA+PROBE: SIGNIFICANT CHANGE UP
HPIV2 RNA SPEC QL NAA+PROBE: SIGNIFICANT CHANGE UP
HPIV3 RNA SPEC QL NAA+PROBE: SIGNIFICANT CHANGE UP
HPIV4 RNA SPEC QL NAA+PROBE: SIGNIFICANT CHANGE UP
M PNEUMO DNA SPEC QL NAA+PROBE: SIGNIFICANT CHANGE UP
MCHC RBC-ENTMCNC: 24.2 PG — LOW (ref 27–34)
MCHC RBC-ENTMCNC: 31.2 GM/DL — LOW (ref 32–36)
MCV RBC AUTO: 77.6 FL — LOW (ref 80–100)
NRBC # BLD: 0 /100 WBCS — SIGNIFICANT CHANGE UP (ref 0–0)
NRBC # FLD: 0 K/UL — SIGNIFICANT CHANGE UP (ref 0–0)
PLATELET # BLD AUTO: 358 K/UL — SIGNIFICANT CHANGE UP (ref 150–400)
POTASSIUM SERPL-MCNC: 4.5 MMOL/L — SIGNIFICANT CHANGE UP (ref 3.5–5.3)
POTASSIUM SERPL-SCNC: 4.5 MMOL/L — SIGNIFICANT CHANGE UP (ref 3.5–5.3)
RAPID RVP RESULT: SIGNIFICANT CHANGE UP
RBC # BLD: 4.38 M/UL — SIGNIFICANT CHANGE UP (ref 3.8–5.2)
RBC # FLD: 15.1 % — HIGH (ref 10.3–14.5)
RSV RNA SPEC QL NAA+PROBE: SIGNIFICANT CHANGE UP
RV+EV RNA SPEC QL NAA+PROBE: SIGNIFICANT CHANGE UP
SARS-COV-2 RNA SPEC QL NAA+PROBE: SIGNIFICANT CHANGE UP
SODIUM SERPL-SCNC: 135 MMOL/L — SIGNIFICANT CHANGE UP (ref 135–145)
WBC # BLD: 19.16 K/UL — HIGH (ref 3.8–10.5)
WBC # FLD AUTO: 19.16 K/UL — HIGH (ref 3.8–10.5)

## 2023-01-07 PROCEDURE — 99232 SBSQ HOSP IP/OBS MODERATE 35: CPT

## 2023-01-07 RX ORDER — OXYCODONE HYDROCHLORIDE 5 MG/1
5 TABLET ORAL EVERY 4 HOURS
Refills: 0 | Status: DISCONTINUED | OUTPATIENT
Start: 2023-01-07 | End: 2023-01-09

## 2023-01-07 RX ADMIN — Medication 650 MILLIGRAM(S): at 18:50

## 2023-01-07 RX ADMIN — Medication 100 MILLIGRAM(S): at 16:26

## 2023-01-07 RX ADMIN — Medication 10 MILLIGRAM(S): at 23:53

## 2023-01-07 RX ADMIN — Medication 75 MICROGRAM(S): at 06:06

## 2023-01-07 RX ADMIN — Medication 20 MILLIGRAM(S): at 06:06

## 2023-01-07 RX ADMIN — Medication 8: at 16:34

## 2023-01-07 RX ADMIN — OXYCODONE HYDROCHLORIDE 5 MILLIGRAM(S): 5 TABLET ORAL at 04:54

## 2023-01-07 RX ADMIN — Medication 650 MILLIGRAM(S): at 19:50

## 2023-01-07 RX ADMIN — Medication 100 MILLIGRAM(S): at 06:07

## 2023-01-07 RX ADMIN — Medication 650 MILLIGRAM(S): at 03:53

## 2023-01-07 RX ADMIN — Medication 10 MILLIGRAM(S): at 06:06

## 2023-01-07 RX ADMIN — ENOXAPARIN SODIUM 40 MILLIGRAM(S): 100 INJECTION SUBCUTANEOUS at 23:54

## 2023-01-07 RX ADMIN — Medication 10 MILLIGRAM(S): at 14:30

## 2023-01-07 RX ADMIN — PANTOPRAZOLE SODIUM 40 MILLIGRAM(S): 20 TABLET, DELAYED RELEASE ORAL at 06:06

## 2023-01-07 RX ADMIN — SUMATRIPTAN SUCCINATE 50 MILLIGRAM(S): 4 INJECTION, SOLUTION SUBCUTANEOUS at 06:06

## 2023-01-07 RX ADMIN — Medication 100 MILLIGRAM(S): at 18:46

## 2023-01-07 RX ADMIN — GABAPENTIN 400 MILLIGRAM(S): 400 CAPSULE ORAL at 14:30

## 2023-01-07 RX ADMIN — SIMVASTATIN 20 MILLIGRAM(S): 20 TABLET, FILM COATED ORAL at 23:52

## 2023-01-07 RX ADMIN — Medication 2: at 07:52

## 2023-01-07 RX ADMIN — Medication 650 MILLIGRAM(S): at 04:50

## 2023-01-07 RX ADMIN — LOSARTAN POTASSIUM 100 MILLIGRAM(S): 100 TABLET, FILM COATED ORAL at 06:06

## 2023-01-07 RX ADMIN — Medication 6: at 11:59

## 2023-01-07 RX ADMIN — SUMATRIPTAN SUCCINATE 50 MILLIGRAM(S): 4 INJECTION, SOLUTION SUBCUTANEOUS at 08:38

## 2023-01-07 RX ADMIN — GABAPENTIN 400 MILLIGRAM(S): 400 CAPSULE ORAL at 23:53

## 2023-01-07 RX ADMIN — GABAPENTIN 400 MILLIGRAM(S): 400 CAPSULE ORAL at 06:06

## 2023-01-07 RX ADMIN — OXYCODONE HYDROCHLORIDE 5 MILLIGRAM(S): 5 TABLET ORAL at 05:40

## 2023-01-07 NOTE — PROGRESS NOTE ADULT - SUBJECTIVE AND OBJECTIVE BOX
Patient is a 77y old  Female who presents with a chief complaint of cervical, thoracic stenosis (07 Jan 2023 02:09)    Hansel Hilliard MD   [facility]   Pager [phone]  Reachable on [employer] Teams     SUBJECTIVE / OVERNIGHT EVENTS:  Patient seen and examined this morning. Continue to have same pain in her back. States that she continues to have dysuria. Denies any chest pain, shortness of breath, cough, congestion, diarrhea. No fevers or chills.      MEDICATIONS  (STANDING):  baclofen 10 milliGRAM(s) Oral every 8 hours  dextrose 5%. 1000 milliLiter(s) (50 mL/Hr) IV Continuous <Continuous>  dextrose 5%. 1000 milliLiter(s) (100 mL/Hr) IV Continuous <Continuous>  dextrose 50% Injectable 25 Gram(s) IV Push once  dextrose 50% Injectable 12.5 Gram(s) IV Push once  dextrose 50% Injectable 25 Gram(s) IV Push once  enoxaparin Injectable 40 milliGRAM(s) SubCutaneous <User Schedule>  FLUoxetine 20 milliGRAM(s) Oral daily  gabapentin 400 milliGRAM(s) Oral three times a day  glucagon  Injectable 1 milliGRAM(s) IntraMuscular once  insulin lispro (ADMELOG) corrective regimen sliding scale   SubCutaneous three times a day before meals  insulin lispro (ADMELOG) corrective regimen sliding scale   SubCutaneous at bedtime  lactulose Syrup 10 Gram(s) Oral two times a day  levothyroxine 75 MICROGram(s) Oral daily  losartan 100 milliGRAM(s) Oral daily  nitrofurantoin monohydrate/macrocrystals (MACROBID) 100 milliGRAM(s) Oral two times a day  pantoprazole    Tablet 40 milliGRAM(s) Oral before breakfast  polyethylene glycol 3350 17 Gram(s) Oral two times a day  saline laxative (FLEET) Rectal Enema 1 Enema Rectal daily  senna 2 Tablet(s) Oral at bedtime  simvastatin 20 milliGRAM(s) Oral at bedtime    MEDICATIONS  (PRN):  acetaminophen     Tablet .. 650 milliGRAM(s) Oral every 6 hours PRN Temp greater or equal to 38C (100.4F), Moderate Pain (4 - 6)  benzonatate 100 milliGRAM(s) Oral three times a day PRN Cough  bisacodyl Suppository 10 milliGRAM(s) Rectal daily PRN Constipation  dextrose Oral Gel 15 Gram(s) Oral once PRN Blood Glucose LESS THAN 70 milliGRAM(s)/deciliter  melatonin 6 milliGRAM(s) Oral at bedtime PRN Sleep  oxyCODONE    IR 5 milliGRAM(s) Oral every 4 hours PRN Moderate Pain (4 - 6)  SUMAtriptan 50 milliGRAM(s) Oral daily PRN Migraine      Vital Signs Last 24 Hrs  T(C): 36.8 (07 Jan 2023 14:32), Max: 37.2 (06 Jan 2023 22:03)  T(F): 98.3 (07 Jan 2023 14:32), Max: 99 (06 Jan 2023 22:03)  HR: 91 (07 Jan 2023 14:32) (70 - 96)  BP: 122/58 (07 Jan 2023 14:32) (122/58 - 158/67)  BP(mean): --  RR: 16 (07 Jan 2023 14:32) (16 - 18)  SpO2: 94% (07 Jan 2023 14:32) (94% - 97%)  CAPILLARY BLOOD GLUCOSE      POCT Blood Glucose.: 263 mg/dL (07 Jan 2023 11:29)  POCT Blood Glucose.: 167 mg/dL (07 Jan 2023 07:08)  POCT Blood Glucose.: 157 mg/dL (06 Jan 2023 21:57)  POCT Blood Glucose.: 195 mg/dL (06 Jan 2023 16:55)    I&O's Summary    06 Jan 2023 07:01  -  07 Jan 2023 07:00  --------------------------------------------------------  IN: 0 mL / OUT: 1550 mL / NET: -1550 mL    07 Jan 2023 07:01  -  07 Jan 2023 15:45  --------------------------------------------------------  IN: 720 mL / OUT: 500 mL / NET: 220 mL        General: NAD, awake and alert  Eyes: conjunctiva clear, nonicteric sclera  HENMT: NCAT  Respiratory: No respiratory distress, CTABL, No rales, rhonchi, wheezing.  Cardiovascular: S1,S2; Regular rate and rhythm; No m/g/r.  Gastrointestinal: Soft, Nontender, obese abdomen; +BS.   Extremities: No c/c/e; warm to touch  Neurological: 3/5 strength in BLEs; reporting diminished sensation to LT bilaterally   Psych: appropriate mood and affect    LABS:                        10.6   19.16 )-----------( 358      ( 07 Jan 2023 05:14 )             34.0     01-07    135  |  98  |  28<H>  ----------------------------<  187<H>  4.5   |  22  |  0.89    Ca    10.0      07 Jan 2023 05:14                RADIOLOGY & ADDITIONAL TESTS:    Imaging Personally Reviewed:    Consultant(s) Notes Reviewed:      Care Discussed with Consultants/Other Providers:

## 2023-01-07 NOTE — PROGRESS NOTE ADULT - PROBLEM SELECTOR PLAN 1
Severe spinal stenosis of L3-L4 and L4-L5 noted on MRI  -initial plan for surgery, however at this time given poor baseline functional status, pt's family is deferring surgery  -?history of colon or ovarian cancer, primary team to obtain records  -CT Chest, Abd and Pelvis reviewed  -seen by oncology, recommending OP follow up

## 2023-01-07 NOTE — PROGRESS NOTE ADULT - SUBJECTIVE AND OBJECTIVE BOX
78 yo F PMH HTN, HLD, DM on metformin, hypothyroidism, depression, and sciatica presents with b/l lower extremity weakness. Pt reports she had a fall about 2 weeks ago and weakness began suddenly one week ago. She has been unable to get out of bed and walk at home. Pt states she has associated numbness, tingling, and pain 7/10 constant in her posterior thighs and lower legs. Pt took acetaminophen at home for pain which helped. Pt also endorses urinary incontinence and stool incontinence and chronic dysuria. Pt denies fever, chills, headache, CP, SOB, N/V/D, or constipation.  Does have lower back pain, non radicular  Patient also report to have ovarian cancer and had oopherectomy 2 years ago, unclear if chemo needed.   PMD is out of [place] Dr. Jorge L Hung from [place]     PAST MEDICAL & SURGICAL HISTORY:  HTN (hypertension)  HLD (hyperlipidemia)  Hypothyroidism  Major depression  DM (diabetes mellitus)  Sciatica  No significant past surgical history    No issues overnight  Vital Signs Last 24 Hrs  T(C): 36.4 (07 Jan 2023 01:51), Max: 37.2 (06 Jan 2023 22:03)  T(F): 97.5 (07 Jan 2023 01:51), Max: 99 (06 Jan 2023 22:03)  HR: 70 (07 Jan 2023 01:51) (70 - 86)  BP: 138/63 (07 Jan 2023 01:51) (134/58 - 142/56)  BP(mean): --  RR: 18 (07 Jan 2023 01:51) (18 - 18)  SpO2: 96% (07 Jan 2023 01:51) (95% - 97%)    Parameters below as of 07 Jan 2023 01:51  Patient On (Oxygen Delivery Method): room air    AAO X 3  PERRLA, EOMI  Face symmetric   Motor: Bilateral UE 5/5  RLE: HF 2/5, KE 3/5, DF/PF 0/5  LLE: HF 3/5, KE 3/5, DF/PF 1/5  SILT    MEDICATIONS  (STANDING):  baclofen 10 milliGRAM(s) Oral every 8 hours  dextrose 5%. 1000 milliLiter(s) (50 mL/Hr) IV Continuous <Continuous>  dextrose 5%. 1000 milliLiter(s) (100 mL/Hr) IV Continuous <Continuous>  dextrose 50% Injectable 25 Gram(s) IV Push once  dextrose 50% Injectable 12.5 Gram(s) IV Push once  dextrose 50% Injectable 25 Gram(s) IV Push once  enoxaparin Injectable 40 milliGRAM(s) SubCutaneous <User Schedule>  FLUoxetine 20 milliGRAM(s) Oral daily  gabapentin 400 milliGRAM(s) Oral three times a day  glucagon  Injectable 1 milliGRAM(s) IntraMuscular once  insulin lispro (ADMELOG) corrective regimen sliding scale   SubCutaneous three times a day before meals  insulin lispro (ADMELOG) corrective regimen sliding scale   SubCutaneous at bedtime  lactulose Syrup 10 Gram(s) Oral two times a day  levothyroxine 75 MICROGram(s) Oral daily  losartan 100 milliGRAM(s) Oral daily  nitrofurantoin monohydrate/macrocrystals (MACROBID) 100 milliGRAM(s) Oral two times a day  pantoprazole    Tablet 40 milliGRAM(s) Oral before breakfast  polyethylene glycol 3350 17 Gram(s) Oral two times a day  saline laxative (FLEET) Rectal Enema 1 Enema Rectal daily  senna 2 Tablet(s) Oral at bedtime  simvastatin 20 milliGRAM(s) Oral at bedtime    MEDICATIONS  (PRN):  acetaminophen     Tablet .. 650 milliGRAM(s) Oral every 6 hours PRN Temp greater or equal to 38C (100.4F), Moderate Pain (4 - 6)  benzonatate 100 milliGRAM(s) Oral three times a day PRN Cough  bisacodyl Suppository 10 milliGRAM(s) Rectal daily PRN Constipation  dextrose Oral Gel 15 Gram(s) Oral once PRN Blood Glucose LESS THAN 70 milliGRAM(s)/deciliter  melatonin 6 milliGRAM(s) Oral at bedtime PRN Sleep  SUMAtriptan 50 milliGRAM(s) Oral daily PRN Migraine    < from: US Duplex Venous Lower Ext Complete, Bilateral (01.06.23 @ 10:32) >  RIGHT:  Normal compressibility of the RIGHT common femoral, femoral and popliteal   veins.  Doppler examination shows normal spontaneous and phasic flow.  No RIGHT calf vein thrombosis is detected.    LEFT:  Normal compressibility of the LEFT common femoral, femoraland popliteal   veins.  Doppler examination shows normal spontaneous and phasic flow.  No LEFT calf vein thrombosis is detected.    IMPRESSION:  No evidence of deep venous thrombosis in either lower extremity.    < end of copied text >

## 2023-01-08 LAB
ANION GAP SERPL CALC-SCNC: 15 MMOL/L — HIGH (ref 7–14)
BUN SERPL-MCNC: 27 MG/DL — HIGH (ref 7–23)
CALCIUM SERPL-MCNC: 9.8 MG/DL — SIGNIFICANT CHANGE UP (ref 8.4–10.5)
CHLORIDE SERPL-SCNC: 101 MMOL/L — SIGNIFICANT CHANGE UP (ref 98–107)
CO2 SERPL-SCNC: 21 MMOL/L — LOW (ref 22–31)
CREAT SERPL-MCNC: 0.92 MG/DL — SIGNIFICANT CHANGE UP (ref 0.5–1.3)
EGFR: 63 ML/MIN/1.73M2 — SIGNIFICANT CHANGE UP
GLUCOSE BLDC GLUCOMTR-MCNC: 130 MG/DL — HIGH (ref 70–99)
GLUCOSE BLDC GLUCOMTR-MCNC: 157 MG/DL — HIGH (ref 70–99)
GLUCOSE BLDC GLUCOMTR-MCNC: 162 MG/DL — HIGH (ref 70–99)
GLUCOSE BLDC GLUCOMTR-MCNC: 190 MG/DL — HIGH (ref 70–99)
GLUCOSE SERPL-MCNC: 156 MG/DL — HIGH (ref 70–99)
HCT VFR BLD CALC: 31.2 % — LOW (ref 34.5–45)
HGB BLD-MCNC: 9.7 G/DL — LOW (ref 11.5–15.5)
MCHC RBC-ENTMCNC: 24 PG — LOW (ref 27–34)
MCHC RBC-ENTMCNC: 31.1 GM/DL — LOW (ref 32–36)
MCV RBC AUTO: 77.2 FL — LOW (ref 80–100)
NRBC # BLD: 0 /100 WBCS — SIGNIFICANT CHANGE UP (ref 0–0)
NRBC # FLD: 0 K/UL — SIGNIFICANT CHANGE UP (ref 0–0)
PLATELET # BLD AUTO: 313 K/UL — SIGNIFICANT CHANGE UP (ref 150–400)
POTASSIUM SERPL-MCNC: 4 MMOL/L — SIGNIFICANT CHANGE UP (ref 3.5–5.3)
POTASSIUM SERPL-SCNC: 4 MMOL/L — SIGNIFICANT CHANGE UP (ref 3.5–5.3)
RBC # BLD: 4.04 M/UL — SIGNIFICANT CHANGE UP (ref 3.8–5.2)
RBC # FLD: 15.8 % — HIGH (ref 10.3–14.5)
SODIUM SERPL-SCNC: 137 MMOL/L — SIGNIFICANT CHANGE UP (ref 135–145)
WBC # BLD: 21.98 K/UL — HIGH (ref 3.8–10.5)
WBC # FLD AUTO: 21.98 K/UL — HIGH (ref 3.8–10.5)

## 2023-01-08 PROCEDURE — 99232 SBSQ HOSP IP/OBS MODERATE 35: CPT

## 2023-01-08 PROCEDURE — 99233 SBSQ HOSP IP/OBS HIGH 50: CPT

## 2023-01-08 RX ORDER — CEFTRIAXONE 500 MG/1
1000 INJECTION, POWDER, FOR SOLUTION INTRAMUSCULAR; INTRAVENOUS EVERY 24 HOURS
Refills: 0 | Status: DISCONTINUED | OUTPATIENT
Start: 2023-01-08 | End: 2023-01-09

## 2023-01-08 RX ADMIN — Medication 650 MILLIGRAM(S): at 14:30

## 2023-01-08 RX ADMIN — GABAPENTIN 400 MILLIGRAM(S): 400 CAPSULE ORAL at 07:39

## 2023-01-08 RX ADMIN — GABAPENTIN 400 MILLIGRAM(S): 400 CAPSULE ORAL at 16:59

## 2023-01-08 RX ADMIN — OXYCODONE HYDROCHLORIDE 5 MILLIGRAM(S): 5 TABLET ORAL at 02:01

## 2023-01-08 RX ADMIN — GABAPENTIN 400 MILLIGRAM(S): 400 CAPSULE ORAL at 21:59

## 2023-01-08 RX ADMIN — Medication 650 MILLIGRAM(S): at 15:22

## 2023-01-08 RX ADMIN — PANTOPRAZOLE SODIUM 40 MILLIGRAM(S): 20 TABLET, DELAYED RELEASE ORAL at 07:39

## 2023-01-08 RX ADMIN — Medication 20 MILLIGRAM(S): at 07:39

## 2023-01-08 RX ADMIN — Medication 6 MILLIGRAM(S): at 22:28

## 2023-01-08 RX ADMIN — SENNA PLUS 2 TABLET(S): 8.6 TABLET ORAL at 22:00

## 2023-01-08 RX ADMIN — ENOXAPARIN SODIUM 40 MILLIGRAM(S): 100 INJECTION SUBCUTANEOUS at 22:00

## 2023-01-08 RX ADMIN — Medication 10 MILLIGRAM(S): at 22:01

## 2023-01-08 RX ADMIN — OXYCODONE HYDROCHLORIDE 5 MILLIGRAM(S): 5 TABLET ORAL at 01:01

## 2023-01-08 RX ADMIN — POLYETHYLENE GLYCOL 3350 17 GRAM(S): 17 POWDER, FOR SOLUTION ORAL at 07:40

## 2023-01-08 RX ADMIN — POLYETHYLENE GLYCOL 3350 17 GRAM(S): 17 POWDER, FOR SOLUTION ORAL at 17:44

## 2023-01-08 RX ADMIN — CEFTRIAXONE 100 MILLIGRAM(S): 500 INJECTION, POWDER, FOR SOLUTION INTRAMUSCULAR; INTRAVENOUS at 17:44

## 2023-01-08 RX ADMIN — Medication 100 MILLIGRAM(S): at 17:44

## 2023-01-08 RX ADMIN — SIMVASTATIN 20 MILLIGRAM(S): 20 TABLET, FILM COATED ORAL at 21:59

## 2023-01-08 RX ADMIN — Medication 75 MICROGRAM(S): at 07:38

## 2023-01-08 RX ADMIN — Medication 100 MILLIGRAM(S): at 07:38

## 2023-01-08 RX ADMIN — Medication 2: at 12:09

## 2023-01-08 RX ADMIN — LOSARTAN POTASSIUM 100 MILLIGRAM(S): 100 TABLET, FILM COATED ORAL at 13:47

## 2023-01-08 RX ADMIN — Medication 2: at 08:45

## 2023-01-08 RX ADMIN — Medication 10 MILLIGRAM(S): at 07:38

## 2023-01-08 RX ADMIN — Medication 10 MILLIGRAM(S): at 16:59

## 2023-01-08 NOTE — PROGRESS NOTE ADULT - PROBLEM SELECTOR PROBLEM 8
Migraine headache
DM (diabetes mellitus)
Hypothyroidism
DM (diabetes mellitus)

## 2023-01-08 NOTE — PROGRESS NOTE ADULT - PROBLEM SELECTOR PLAN 5
-BP well-controlled  -c/w home meds for now with losartan; would hold HCTZ due to hyponatremia  -would hold ACE/ARB day of surgery
-BP well-controlled  -c/w home meds for now with losartan; would hold HCTZ for now  -would hold ACE/ARB day of procedure
-patient has a history of left sided sciatica and takes Meloxicam 5mg at home  -c/w Tylenol PRN for pain
-BP well-controlled  -c/w home meds for now with losartan; would hold HCTZ for now  -would hold ACE/ARB day of procedure
-BP well-controlled  -c/w home meds for now with losartan; would hold HCTZ due to hyponatremia  -would hold ACE/ARB day of surgery
-BP well-controlled  -c/w losartan; hold HCTZ due to hyponatremia
-c/w statin
-patient has a history of left sided sciatica and takes Meloxicam 5mg at home  -c/w Tylenol PRN for pain
-patient has a history of left sided sciatica and takes Meloxicam 5mg at home  -c/w Tylenol PRN for pain

## 2023-01-08 NOTE — PROGRESS NOTE ADULT - PROBLEM SELECTOR PLAN 12
-c/w Lovenox subq daily
-c/w Lovenox subq daily  d/c planning to rehab
-c/w lovenox subq daily
-c/w Lovenox subq daily  d/c planning to rehab
-c/w Lovenox subq daily  d/c planning to rehab

## 2023-01-08 NOTE — PROGRESS NOTE ADULT - PROBLEM SELECTOR PLAN 6
-c/w statin
-BP well-controlled  -c/w losartan; hold HCTZ due to hyponatremia
-c/w statin
-c/w statin
-XeK3g=7.6%. FS well controlled. Continue with ISS for now and continue to monitor FS closely.  -holding home metformin dose
-BP well-controlled  -c/w losartan; hold HCTZ due to hyponatremia
-BP well-controlled  -c/w losartan; hold HCTZ due to hyponatremia

## 2023-01-08 NOTE — PROGRESS NOTE ADULT - SUBJECTIVE AND OBJECTIVE BOX
Patient is a 77y old  Female who presents with a chief complaint of cervical, thoracic stenosis (08 Jan 2023 01:34)    Hansel Hilliard MD   [facility]   Pager [phone]  Reachable on [employer] Teams     SUBJECTIVE / OVERNIGHT EVENTS:  Patient seen and examined this morning. States that she continues to have dysuria. Denies any chest pain, shortness of breath, cough, congestion, diarrhea, fevers or chills.      MEDICATIONS  (STANDING):  baclofen 10 milliGRAM(s) Oral every 8 hours  dextrose 5%. 1000 milliLiter(s) (100 mL/Hr) IV Continuous <Continuous>  dextrose 5%. 1000 milliLiter(s) (50 mL/Hr) IV Continuous <Continuous>  dextrose 50% Injectable 25 Gram(s) IV Push once  dextrose 50% Injectable 25 Gram(s) IV Push once  dextrose 50% Injectable 12.5 Gram(s) IV Push once  enoxaparin Injectable 40 milliGRAM(s) SubCutaneous <User Schedule>  FLUoxetine 20 milliGRAM(s) Oral daily  gabapentin 400 milliGRAM(s) Oral three times a day  glucagon  Injectable 1 milliGRAM(s) IntraMuscular once  insulin lispro (ADMELOG) corrective regimen sliding scale   SubCutaneous three times a day before meals  insulin lispro (ADMELOG) corrective regimen sliding scale   SubCutaneous at bedtime  lactulose Syrup 10 Gram(s) Oral two times a day  levothyroxine 75 MICROGram(s) Oral daily  losartan 100 milliGRAM(s) Oral daily  nitrofurantoin monohydrate/macrocrystals (MACROBID) 100 milliGRAM(s) Oral two times a day  pantoprazole    Tablet 40 milliGRAM(s) Oral before breakfast  polyethylene glycol 3350 17 Gram(s) Oral two times a day  saline laxative (FLEET) Rectal Enema 1 Enema Rectal daily  senna 2 Tablet(s) Oral at bedtime  simvastatin 20 milliGRAM(s) Oral at bedtime    MEDICATIONS  (PRN):  acetaminophen     Tablet .. 650 milliGRAM(s) Oral every 6 hours PRN Temp greater or equal to 38C (100.4F), Moderate Pain (4 - 6)  benzonatate 100 milliGRAM(s) Oral three times a day PRN Cough  bisacodyl Suppository 10 milliGRAM(s) Rectal daily PRN Constipation  dextrose Oral Gel 15 Gram(s) Oral once PRN Blood Glucose LESS THAN 70 milliGRAM(s)/deciliter  melatonin 6 milliGRAM(s) Oral at bedtime PRN Sleep  oxyCODONE    IR 5 milliGRAM(s) Oral every 4 hours PRN Moderate Pain (4 - 6)  SUMAtriptan 50 milliGRAM(s) Oral daily PRN Migraine      Vital Signs Last 24 Hrs  T(C): 37.1 (08 Jan 2023 09:34), Max: 37.1 (07 Jan 2023 18:15)  T(F): 98.8 (08 Jan 2023 09:34), Max: 98.8 (07 Jan 2023 21:22)  HR: 96 (08 Jan 2023 09:34) (75 - 96)  BP: 126/63 (08 Jan 2023 09:34) (103/65 - 146/53)  BP(mean): --  RR: 18 (08 Jan 2023 09:34) (16 - 18)  SpO2: 96% (08 Jan 2023 09:34) (94% - 99%)  CAPILLARY BLOOD GLUCOSE      POCT Blood Glucose.: 157 mg/dL (08 Jan 2023 12:06)  POCT Blood Glucose.: 190 mg/dL (08 Jan 2023 08:23)  POCT Blood Glucose.: 120 mg/dL (07 Jan 2023 22:01)  POCT Blood Glucose.: 318 mg/dL (07 Jan 2023 16:31)    I&O's Summary    07 Jan 2023 07:01  -  08 Jan 2023 07:00  --------------------------------------------------------  IN: 720 mL / OUT: 500 mL / NET: 220 mL    08 Jan 2023 07:01  -  08 Jan 2023 13:12  --------------------------------------------------------  IN: 0 mL / OUT: 500 mL / NET: -500 mL        General: NAD, awake and alert  Eyes: conjunctiva clear, nonicteric sclera  HENMT: NCAT  Respiratory: No respiratory distress, CTABL, No rales, rhonchi, wheezing.  Cardiovascular: S1,S2; Regular rate and rhythm; No m/g/r.  Gastrointestinal: Soft, Nontender, obese abdomen; +BS.   Extremities: No c/c/e; warm to touch  Neurological: 3/5 strength in BLEs; reporting diminished sensation to LT bilaterally   Psych: appropriate mood and affect    LABS:                        9.7    21.98 )-----------( 313      ( 08 Jan 2023 06:40 )             31.2     01-08    137  |  101  |  27<H>  ----------------------------<  156<H>  4.0   |  21<L>  |  0.92    Ca    9.8      08 Jan 2023 06:40                RADIOLOGY & ADDITIONAL TESTS:    Imaging Personally Reviewed:    Consultant(s) Notes Reviewed:      Care Discussed with Consultants/Other Providers:

## 2023-01-08 NOTE — PROGRESS NOTE ADULT - PROBLEM SELECTOR PROBLEM 10
Prophylactic measure
Migraine headache
Major depression
Migraine headache
Major depression
Migraine headache
Major depression

## 2023-01-08 NOTE — PROGRESS NOTE ADULT - PROBLEM SELECTOR PLAN 4
-patient has a history of left sided sciatica and takes Meloxicam 5mg at home  -c/w Tylenol PRN for pain
-patient has a history of left sided sciatica and takes Meloxicam 5mg at home.   -c/w Tylenol PRN for pain
-patient has a history of left sided sciatica and takes Meloxicam 5mg at home.   -c/w Tylenol PRN for pain
-patient has a history of left sided sciatica and takes Meloxicam 5mg at home  -c/w Tylenol PRN for pain
-patient has a history of left sided sciatica and takes Meloxicam 5mg at home  -c/w Tylenol PRN for pain
-BP well-controlled  -c/w home meds for now with losartan; would hold HCTZ for now  -would hold ACE/ARB day of procedure
-Patient had MRI at [place] which showed severe spinal stenosis at L3-4, L4-5, degenerative changes and stenosis adjacent to prior ACDF at C2-T2. Patient was then transferred to [facility] for surgical eval under NSG.   -Bilateral LE weakness 2/2 severe spinal stenosis  -no plan for surgery at this time  -c/w home meds with gabapentin and baclofen
-Patient had MRI at [place] which showed severe spinal stenosis at L3-4, L4-5, degenerative changes and stenosis adjacent to prior ACDF at C2-T2. Patient was then transferred to [facility] for surgical eval under NSG.   -Bilateral LE weakness 2/2 severe spinal stenosis  -no plan for surgery at this time  -c/w home meds with gabapentin and baclofen
-Patient had MRI at [place] which showed severe spinal stenosis at L3-4, L4-5, degenerative changes and stenosis adjacent to prior ACDF at C2-T2. Patient was then transferred to [facility] for surgical eval under NSG.   -Bilateral LE weakness 2/2 severe spinal stenosis  -no plan for surgery at this time  -c/w home meds with gabapentin and baclofen

## 2023-01-08 NOTE — PROGRESS NOTE ADULT - PROBLEM SELECTOR PROBLEM 7
HLD (hyperlipidemia)
Acute UTI
DM (diabetes mellitus)
Hypothyroidism
HLD (hyperlipidemia)
DM (diabetes mellitus)
HLD (hyperlipidemia)
Acute UTI
DM (diabetes mellitus)

## 2023-01-08 NOTE — PROGRESS NOTE ADULT - PROBLEM SELECTOR PLAN 11
-c/w lovenox subq daily
-c/w Prozac
-c/w lovenox subq daily
-c/w Prozac
-c/w lovenox subq daily

## 2023-01-08 NOTE — PROGRESS NOTE ADULT - SUBJECTIVE AND OBJECTIVE BOX
76 yo F PMH HTN, HLD, DM on metformin, hypothyroidism, depression, and sciatica presents with b/l lower extremity weakness. Pt reports she had a fall about 2 weeks ago and weakness began suddenly one week ago. She has been unable to get out of bed and walk at home. Pt states she has associated numbness, tingling, and pain 7/10 constant in her posterior thighs and lower legs. Pt took acetaminophen at home for pain which helped. Pt also endorses urinary incontinence and stool incontinence and chronic dysuria. Pt denies fever, chills, headache, CP, SOB, N/V/D, or constipation.  Does have lower back pain, non radicular  Patient also report to have ovarian cancer and had oopherectomy 2 years ago, unclear if chemo needed.   PMD is out of [place] Dr. Jorge L Hung from [place]     PAST MEDICAL & SURGICAL HISTORY:  HTN (hypertension)  HLD (hyperlipidemia)  Hypothyroidism  Major depression  DM (diabetes mellitus)  Sciatica  No significant past surgical history    No issues overnight  Vital Signs Last 24 Hrs  T(C): 37.1 (07 Jan 2023 21:22), Max: 37.1 (07 Jan 2023 05:50)  T(F): 98.8 (07 Jan 2023 21:22), Max: 98.8 (07 Jan 2023 05:50)  HR: 93 (07 Jan 2023 21:22) (70 - 96)  BP: 143/60 (07 Jan 2023 21:22) (122/58 - 158/67)  BP(mean): --  RR: 17 (07 Jan 2023 21:22) (16 - 18)  SpO2: 97% (07 Jan 2023 21:22) (94% - 99%)    Parameters below as of 07 Jan 2023 21:22  Patient On (Oxygen Delivery Method): room air    AAO X 3  PERRLA, EOMI  Face symmetric   Motor: Bilateral UE 5/5  RLE: HF 2/5, KE 3/5, DF/PF 0/5  LLE: HF 3/5, KE 3/5, DF/PF 1/5  SILT    MEDICATIONS  (STANDING):  baclofen 10 milliGRAM(s) Oral every 8 hours  dextrose 5%. 1000 milliLiter(s) (50 mL/Hr) IV Continuous <Continuous>  dextrose 5%. 1000 milliLiter(s) (100 mL/Hr) IV Continuous <Continuous>  dextrose 50% Injectable 25 Gram(s) IV Push once  dextrose 50% Injectable 12.5 Gram(s) IV Push once  dextrose 50% Injectable 25 Gram(s) IV Push once  enoxaparin Injectable 40 milliGRAM(s) SubCutaneous <User Schedule>  FLUoxetine 20 milliGRAM(s) Oral daily  gabapentin 400 milliGRAM(s) Oral three times a day  glucagon  Injectable 1 milliGRAM(s) IntraMuscular once  insulin lispro (ADMELOG) corrective regimen sliding scale   SubCutaneous three times a day before meals  insulin lispro (ADMELOG) corrective regimen sliding scale   SubCutaneous at bedtime  lactulose Syrup 10 Gram(s) Oral two times a day  levothyroxine 75 MICROGram(s) Oral daily  losartan 100 milliGRAM(s) Oral daily  nitrofurantoin monohydrate/macrocrystals (MACROBID) 100 milliGRAM(s) Oral two times a day  pantoprazole    Tablet 40 milliGRAM(s) Oral before breakfast  polyethylene glycol 3350 17 Gram(s) Oral two times a day  saline laxative (FLEET) Rectal Enema 1 Enema Rectal daily  senna 2 Tablet(s) Oral at bedtime  simvastatin 20 milliGRAM(s) Oral at bedtime    MEDICATIONS  (PRN):  acetaminophen     Tablet .. 650 milliGRAM(s) Oral every 6 hours PRN Temp greater or equal to 38C (100.4F), Moderate Pain (4 - 6)  benzonatate 100 milliGRAM(s) Oral three times a day PRN Cough  bisacodyl Suppository 10 milliGRAM(s) Rectal daily PRN Constipation  dextrose Oral Gel 15 Gram(s) Oral once PRN Blood Glucose LESS THAN 70 milliGRAM(s)/deciliter  melatonin 6 milliGRAM(s) Oral at bedtime PRN Sleep  oxyCODONE    IR 5 milliGRAM(s) Oral every 4 hours PRN Moderate Pain (4 - 6)  SUMAtriptan 50 milliGRAM(s) Oral daily PRN Migraine

## 2023-01-08 NOTE — PROGRESS NOTE ADULT - PROBLEM SELECTOR PROBLEM 11
Prophylactic measure
Major depression
Prophylactic measure
Major depression
Prophylactic measure

## 2023-01-08 NOTE — PROGRESS NOTE ADULT - PROBLEM SELECTOR PROBLEM 6
HLD (hyperlipidemia)
DM (diabetes mellitus)
HLD (hyperlipidemia)
HLD (hyperlipidemia)
HTN (hypertension)
HTN (hypertension)
HLD (hyperlipidemia)
HTN (hypertension)
HLD (hyperlipidemia)

## 2023-01-08 NOTE — PROGRESS NOTE ADULT - PROBLEM SELECTOR PROBLEM 2
Lower extremity weakness
Acute UTI
Hyponatremia

## 2023-01-08 NOTE — PROGRESS NOTE ADULT - PROBLEM SELECTOR PROBLEM 3
Sciatica
Hyponatremia
Lower extremity weakness
Lower extremity weakness
Hyponatremia
Lower extremity weakness
Hyponatremia

## 2023-01-08 NOTE — PROGRESS NOTE ADULT - PROBLEM SELECTOR PROBLEM 4
Sciatica
Sciatica
HTN (hypertension)
Lower extremity weakness
Lower extremity weakness
Sciatica
Lower extremity weakness

## 2023-01-08 NOTE — PROGRESS NOTE ADULT - PROBLEM SELECTOR PLAN 9
-TSH mildly elevated  -c/w home dose of Synthroid 75mcg PO daily  -repeat TSH/T3/T4 in 4-6 weeks
-TSH mildly elevated  -c/w home dose of Synthroid 75mcg PO daily  -recommend to follow-up outpatient labs 4-6 weeks
-patient on home sumatriptan 25mg prn headache  -will hold for now
-TSH mildly elevated  -c/w home dose of Synthroid 75mcg PO daily  -recommend to follow-up outpatient labs 4-6 weeks
-patient on home sumatriptan 25mg x1 prn headache  -will hold for now
-TSH mildly elevated  -c/w home dose of Synthroid 75mcg PO daily  -repeat TSH/T3/T4 in 4-6 weeks
-c/w Prozac
-patient on home sumatriptan 25mg x1 prn headache  -will hold for now
-TSH mildly elevated  -c/w home dose of Synthroid 75mcg PO daily  -repeat TSH/T3/T4 in 4-6 weeks

## 2023-01-08 NOTE — PROGRESS NOTE ADULT - PROBLEM SELECTOR PROBLEM 9
Hypothyroidism
Major depression
Hypothyroidism
Hypothyroidism
Migraine headache
Migraine headache
Hypothyroidism
Migraine headache
Hypothyroidism

## 2023-01-08 NOTE — PROGRESS NOTE ADULT - PROBLEM SELECTOR PROBLEM 5
HLD (hyperlipidemia)
Sciatica
HTN (hypertension)
Sciatica
HTN (hypertension)
Sciatica

## 2023-01-08 NOTE — PROGRESS NOTE ADULT - PROBLEM SELECTOR PLAN 2
-likely dehydrated  -will encourage PO hydration today and f/u BMP in am
-likely dehydrated  -will encourage PO hydration today and f/u BMP in am
Pt c/o dysuria, UA positive  -d/c Cipro and start nitrofurantoin x7 days  -urine culture growing E. coli
s/p IVF, now resolved
Leukocytosis worsening and patient complaining of persistent dysuria   - urine culture growing E. coli - resistant to bactrim and fluoroquinolones   - patient with remote penicillin allergy, patient dosen't know her reaction. Daughter Karla believes this is not true allergy, believes that she has been prescribed Augmentin in past   - would administer ceftriaxone given low cross reactivity with penicillin allergic patients   [ ] f/u repeat UA/UCx
s/p IVF, now resolved
-Patient had MRI at [place] which showed, severe spinal stenosis at L3-4, L4-5, degenerative changes and stenosis adjacent to prior ACDF at C2-T2. Patient was then transferred to [facility] for surgical eval under NSG.   -Bilateral LE weakness 2/2 severe spinal stenosis  -awaiting CT C/T/L spine without contrast   -plan per neurosurgery team  -c/w home meds with gabapentin and baclofen
Pt c/o dysuria, UA positive  -urine culture growing E. coli  - today with significantly increase in leukocytosis  [ ] continues to endorse dysuria despite treatment please recheck UA,UCx  -c/w nitrofurantoin x7 days
Na 134  -continue to monitor

## 2023-01-08 NOTE — PROGRESS NOTE ADULT - PROBLEM SELECTOR PLAN 10
-c/w Prozac
-c/w Prozac
-patient on home sumatriptan 25mg prn headache  -will hold for now
-recommend teds and venodynes
-patient on home sumatriptan 25mg prn headache  -will hold for now
-c/w Prozac
-patient on home sumatriptan 25mg prn headache  -will hold for now

## 2023-01-08 NOTE — PROGRESS NOTE ADULT - PROBLEM SELECTOR PLAN 3
-Patient had MRI at [place] which showed, severe spinal stenosis at L3-4, L4-5, degenerative changes and stenosis adjacent to prior ACDF at C2-T2. Patient was then transferred to [facility] for surgical eval under NSG.   -Bilateral LE weakness 2/2 severe spinal stenosis  -plan per neurosurgery team  -c/w home meds with gabapentin and baclofen
-Patient had MRI at [place] which showed, severe spinal stenosis at L3-4, L4-5, degenerative changes and stenosis adjacent to prior ACDF at C2-T2. Patient was then transferred to [facility] for surgical eval under NSG.   -Bilateral LE weakness 2/2 severe spinal stenosis  -awaiting CT C/T/L spine without contrast   -plan per neurosurgery team  -c/w home meds with gabapentin and baclofen
Na 134  -continue to monitor
-Patient had MRI at [place] which showed severe spinal stenosis at L3-4, L4-5, degenerative changes and stenosis adjacent to prior ACDF at C2-T2. Patient was then transferred to [facility] for surgical eval under NSG.   -Bilateral LE weakness 2/2 severe spinal stenosis  -plan per neurosurgery team  -c/w home meds with gabapentin and baclofen
-Patient had MRI at [place] which showed, severe spinal stenosis at L3-4, L4-5, degenerative changes and stenosis adjacent to prior ACDF at C2-T2. Patient was then transferred to [facility] for surgical eval under NSG.   -Bilateral LE weakness 2/2 severe spinal stenosis  -CT C/T/L spine without contrast performed   -plan per neurosurgery team  -c/w home meds with gabapentin and baclofen
-Patient had MRI at [place] which showed severe spinal stenosis at L3-4, L4-5, degenerative changes and stenosis adjacent to prior ACDF at C2-T2. Patient was then transferred to [facility] for surgical eval under NSG.   -Bilateral LE weakness 2/2 severe spinal stenosis  -no plan for surgery at this time  -c/w home meds with gabapentin and baclofen
-patient has a history of left sided sciatica and takes Meloxicam 5mg at home.   -c/w Tylenol PRN for pain
Na 134  -continue to monitor
Na 134  -continue to monitor

## 2023-01-08 NOTE — PROGRESS NOTE ADULT - PROBLEM SELECTOR PLAN 8
-TSH mildly elevated  -c/w home dose of Synthroid 75mcg PO daily  -recommend to follow-up outpatient labs 4-6 weeks
-TSH mildly elevated  -c/w home dose of Synthroid 75mcg PO daily  -recommend to follow-up outpatient labs 4-6 weeks
-KmS3p=3.6%. FS well controlled. Continue with ISS for now and continue to monitor FS closely.  -holding home metformin
-TSH mildly elevated  -c/w home dose of Synthroid 75mcg PO daily  -recommend to follow-up outpatient labs 4-6 weeks
-FlU8r=9.6%. FS well controlled. Continue with ISS for now and continue to monitor FS closely.  -holding home metformin dose
-patient on home sumatriptan 25mg x1 prn headache  -will hold for now
-GwV1g=2.6%. FS well controlled. Continue with ISS for now and continue to monitor FS closely.  -holding home metformin
-JdA6b=7.6%. FS well controlled. Continue with ISS for now and continue to monitor FS closely.  -holding home metformin dose
-OxM6h=6.6%. FS well controlled. Continue with ISS for now and continue to monitor FS closely.  -holding home metformin

## 2023-01-09 ENCOUNTER — TRANSCRIPTION ENCOUNTER (OUTPATIENT)
Age: 78
End: 2023-01-09

## 2023-01-09 VITALS
TEMPERATURE: 99 F | OXYGEN SATURATION: 95 % | RESPIRATION RATE: 17 BRPM | DIASTOLIC BLOOD PRESSURE: 49 MMHG | SYSTOLIC BLOOD PRESSURE: 114 MMHG | HEART RATE: 67 BPM

## 2023-01-09 LAB
ANION GAP SERPL CALC-SCNC: 8 MMOL/L — SIGNIFICANT CHANGE UP (ref 7–14)
BUN SERPL-MCNC: 27 MG/DL — HIGH (ref 7–23)
CALCIUM SERPL-MCNC: 9.8 MG/DL — SIGNIFICANT CHANGE UP (ref 8.4–10.5)
CHLORIDE SERPL-SCNC: 103 MMOL/L — SIGNIFICANT CHANGE UP (ref 98–107)
CO2 SERPL-SCNC: 26 MMOL/L — SIGNIFICANT CHANGE UP (ref 22–31)
CREAT SERPL-MCNC: 0.9 MG/DL — SIGNIFICANT CHANGE UP (ref 0.5–1.3)
CULTURE RESULTS: SIGNIFICANT CHANGE UP
EGFR: 65 ML/MIN/1.73M2 — SIGNIFICANT CHANGE UP
GLUCOSE BLDC GLUCOMTR-MCNC: 177 MG/DL — HIGH (ref 70–99)
GLUCOSE BLDC GLUCOMTR-MCNC: 186 MG/DL — HIGH (ref 70–99)
GLUCOSE SERPL-MCNC: 144 MG/DL — HIGH (ref 70–99)
HCT VFR BLD CALC: 28.5 % — LOW (ref 34.5–45)
HGB BLD-MCNC: 9 G/DL — LOW (ref 11.5–15.5)
MCHC RBC-ENTMCNC: 24 PG — LOW (ref 27–34)
MCHC RBC-ENTMCNC: 31.6 GM/DL — LOW (ref 32–36)
MCV RBC AUTO: 76 FL — LOW (ref 80–100)
NRBC # BLD: 0 /100 WBCS — SIGNIFICANT CHANGE UP (ref 0–0)
NRBC # FLD: 0 K/UL — SIGNIFICANT CHANGE UP (ref 0–0)
PLATELET # BLD AUTO: 339 K/UL — SIGNIFICANT CHANGE UP (ref 150–400)
POTASSIUM SERPL-MCNC: 4.3 MMOL/L — SIGNIFICANT CHANGE UP (ref 3.5–5.3)
POTASSIUM SERPL-SCNC: 4.3 MMOL/L — SIGNIFICANT CHANGE UP (ref 3.5–5.3)
RBC # BLD: 3.75 M/UL — LOW (ref 3.8–5.2)
RBC # FLD: 15.7 % — HIGH (ref 10.3–14.5)
SODIUM SERPL-SCNC: 137 MMOL/L — SIGNIFICANT CHANGE UP (ref 135–145)
SPECIMEN SOURCE: SIGNIFICANT CHANGE UP
WBC # BLD: 12.95 K/UL — HIGH (ref 3.8–10.5)
WBC # FLD AUTO: 12.95 K/UL — HIGH (ref 3.8–10.5)

## 2023-01-09 PROCEDURE — 99233 SBSQ HOSP IP/OBS HIGH 50: CPT

## 2023-01-09 RX ORDER — ENOXAPARIN SODIUM 100 MG/ML
40 INJECTION SUBCUTANEOUS
Qty: 0 | Refills: 0 | DISCHARGE
Start: 2023-01-09

## 2023-01-09 RX ORDER — OXYCODONE HYDROCHLORIDE 5 MG/1
1 TABLET ORAL
Qty: 0 | Refills: 0 | DISCHARGE
Start: 2023-01-09

## 2023-01-09 RX ORDER — PANTOPRAZOLE SODIUM 20 MG/1
1 TABLET, DELAYED RELEASE ORAL
Qty: 0 | Refills: 0 | DISCHARGE
Start: 2023-01-09

## 2023-01-09 RX ORDER — LANOLIN ALCOHOL/MO/W.PET/CERES
2 CREAM (GRAM) TOPICAL
Qty: 0 | Refills: 0 | DISCHARGE
Start: 2023-01-09

## 2023-01-09 RX ORDER — SENNA PLUS 8.6 MG/1
2 TABLET ORAL
Qty: 0 | Refills: 0 | DISCHARGE
Start: 2023-01-09

## 2023-01-09 RX ORDER — POLYETHYLENE GLYCOL 3350 17 G/17G
17 POWDER, FOR SOLUTION ORAL
Qty: 0 | Refills: 0 | DISCHARGE
Start: 2023-01-09

## 2023-01-09 RX ORDER — CEFPODOXIME PROXETIL 100 MG
1 TABLET ORAL
Qty: 10 | Refills: 0
Start: 2023-01-09 | End: 2023-01-13

## 2023-01-09 RX ORDER — MELOXICAM 15 MG/1
1 TABLET ORAL
Qty: 0 | Refills: 0 | DISCHARGE

## 2023-01-09 RX ORDER — OMEPRAZOLE 10 MG/1
1 CAPSULE, DELAYED RELEASE ORAL
Qty: 0 | Refills: 0 | DISCHARGE

## 2023-01-09 RX ADMIN — GABAPENTIN 400 MILLIGRAM(S): 400 CAPSULE ORAL at 13:16

## 2023-01-09 RX ADMIN — Medication 100 MILLIGRAM(S): at 13:15

## 2023-01-09 RX ADMIN — LOSARTAN POTASSIUM 100 MILLIGRAM(S): 100 TABLET, FILM COATED ORAL at 06:59

## 2023-01-09 RX ADMIN — Medication 2: at 11:43

## 2023-01-09 RX ADMIN — POLYETHYLENE GLYCOL 3350 17 GRAM(S): 17 POWDER, FOR SOLUTION ORAL at 06:59

## 2023-01-09 RX ADMIN — Medication 10 MILLIGRAM(S): at 13:15

## 2023-01-09 RX ADMIN — Medication 10 MILLIGRAM(S): at 06:57

## 2023-01-09 RX ADMIN — OXYCODONE HYDROCHLORIDE 5 MILLIGRAM(S): 5 TABLET ORAL at 05:14

## 2023-01-09 RX ADMIN — Medication 2: at 07:37

## 2023-01-09 RX ADMIN — OXYCODONE HYDROCHLORIDE 5 MILLIGRAM(S): 5 TABLET ORAL at 06:00

## 2023-01-09 RX ADMIN — Medication 20 MILLIGRAM(S): at 05:26

## 2023-01-09 RX ADMIN — GABAPENTIN 400 MILLIGRAM(S): 400 CAPSULE ORAL at 06:56

## 2023-01-09 RX ADMIN — PANTOPRAZOLE SODIUM 40 MILLIGRAM(S): 20 TABLET, DELAYED RELEASE ORAL at 06:57

## 2023-01-09 RX ADMIN — Medication 75 MICROGRAM(S): at 05:26

## 2023-01-09 RX ADMIN — LACTULOSE 10 GRAM(S): 10 SOLUTION ORAL at 06:57

## 2023-01-09 NOTE — DISCHARGE NOTE NURSING/CASE MANAGEMENT/SOCIAL WORK - NSDCPNINST_GEN_ALL_CORE
Make a follow up appointment with your physician. Physical therapy as instructed. Take your antibiotics as prescribed.

## 2023-01-09 NOTE — DIETITIAN INITIAL EVALUATION ADULT - PERTINENT LABORATORY DATA
01-09    137  |  103  |  27<H>  ----------------------------<  144<H>  4.3   |  26  |  0.90    Ca    9.8      09 Jan 2023 06:24    CAPILLARY BLOOD GLUCOSE  POCT Blood Glucose.: 186 mg/dL (09 Jan 2023 07:21)  POCT Blood Glucose.: 162 mg/dL (08 Jan 2023 22:03)  POCT Blood Glucose.: 130 mg/dL (08 Jan 2023 16:22)  POCT Blood Glucose.: 157 mg/dL (08 Jan 2023 12:06)    A1C with Estimated Average Glucose Result: 6.6 % (12-31-22 @ 05:33)  A1C with Estimated Average Glucose Result: 6.6 % (12-30-22 @ 17:06)

## 2023-01-09 NOTE — DISCHARGE NOTE PROVIDER - NSDCCPCAREPLAN_GEN_ALL_CORE_FT
September 10, 2020     Re:  Arnel Bowlinginowski  : 1987  MRN:  [id number]    Date of Service:  9/10/2020                          Date of Injury:  2019     DIAGNOSIS (ICD-9): S/P right subtalar joint fusion  Right placement of autograft bone mixed with Augment allograft     S/P Excision of Stieda's' process via lateral approach, right  Exostectomy of calcaneal posterior process, right  Repair of peroneal retinaculum, right    +Subtalar joint synovitis and DJD, Right  +Posterior-inferior talar body bony edema c/w either DJD of STJ or subchondral fractures     TREATMENT:     - Begin partial weight bearing as tolerated   - Remain in CAM boot   - Continue to ice and elevate   - Continue to wiggle toes and bend at the knee throughout the day     MEDICATION:  Asprin 81mg      WORK STATUS:  Patient unable to return to work.                   FOLLOW-UP:  Next appointment will be on 10/2/2020 at 9:00 am.     If you should have any questions regarding this note, please do not hesitate in calling our office          Conner Mcclain DPM  [address] [address]  [phone]   PRINCIPAL DISCHARGE DIAGNOSIS  Diagnosis: Spinal stenosis  Assessment and Plan of Treatment:

## 2023-01-09 NOTE — PROGRESS NOTE ADULT - REASON FOR ADMISSION
cervical, thoracic stenosis

## 2023-01-09 NOTE — DISCHARGE NOTE NURSING/CASE MANAGEMENT/SOCIAL WORK - NSDPDISTO_GEN_ALL_CORE
Sub-Acute rehab Pt. is afebrile and offers no complaints. In no acute distress. Transfers/ambulation with max assistance. Voiding via primafit. + BM today. Tolerating diet well/Sub-Acute rehab

## 2023-01-09 NOTE — PROGRESS NOTE ADULT - PROVIDER SPECIALTY LIST ADULT
Neurosurgery
Rehab Medicine
Neurosurgery
Hospitalist
Neurosurgery
Hospitalist

## 2023-01-09 NOTE — DIETITIAN INITIAL EVALUATION ADULT - ADD RECOMMEND
1) Monitor weights, PO intake/diet tolerance, skin integrity, pertinent labs.   2) Please consistently document % PO intake in nursing flowsheets to assess adequacy of nutritional intake/monitor need for further nutritional intervention(s).   3) Honor food preferences as requested and available.

## 2023-01-09 NOTE — CHART NOTE - NSCHARTNOTEFT_GEN_A_CORE
Patient plan to be discharged to rehab at 1pm. Discussed with primary team abx transition. Pt will not get her dose of ceftriaxone today. Plan to switch her to cefpodoxime which is usually a 5-7 day course. So, pt to be discharged on  cefpodoxime 100 mg twice daily for 5 days -  until the 13th. Discussed with primary team. patient tolerated ceftriaxone, without any issues. leukocytosis downtrending on medication

## 2023-01-09 NOTE — PROGRESS NOTE ADULT - ASSESSMENT
76 yo F PMH HTN, HLD, DM on metformin, hypothyroidism, depression, and sciatica presents with b/l lower extremity weakness with MRI findings of severe spinal stenosis at L3-4, L4-5, degenerative changes and stenosis adjacent to prior ACDF at C2-T2
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 CT CAP and repeat CT C/T/L done, will fu read, palliative discussions pending 
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 CT CAP and repeat CT C/T/L done, will fu read, palliative discussions pending     Plan:  - [facility] floor, q4h neuro checks  - fu AM labs including ca markers and type and screen   - SQL restarted, NPO cancelled  - Fu read CT C/T/L and CT CAP+C  - med clearance to be documented this AM  - pending d/w daughter, Surgical date TBD.  - diet restarted  - pain control  - cont SSI and home meds  - Preop BLE duplex neg    [id number]
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 CT CAP and repeat CT C/T/L done, will fu read, palliative discussions pending   1/4-5: pending callback from onc at [place] Dr. Andrews , UA +, urine Cx sent, tentative OR Friday 1/6: Patient and family declining surgery, plan for discharge to rehab. Will obtain bilateral LE dopplers  1/7-8: Stable exam. Dopplers negative. Await rehab placement
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 CT CAP and repeat CT C/T/L done, will fu read, palliative discussions pending   1/4-5: pending callback from onc at [place] Dr. Andrews , UA +, urine Cx sent, tentative OR Friday 1/6: Patient and family declining surgery, plan for discharge to rehab. Will obtain bilateral LE dopplers  1/7: Stable exam. Dopplers negative. Await rehab placement
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 CT CAP and repeat CT C/T/L done, will fu read, palliative discussions pending   1/4-5: pending callback from onc at [place] Dr. Andrews , UA +, urine Cx sent, tentative OR Friday 1/6: Patient and family declining surgery, plan for discharge to rehab. Will obtain bilateral LE dopplers  1/7-9: Stable exam. Dopplers negative. Await rehab placement
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 CT CAP and repeat CT C/T/L done, will fu read, palliative discussions pending   1/4: pending callback from onc at [place] Dr. Andrews , UA +, urine cltr sent, tentative OR friday
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 CT CAP and repeat CT C/T/L done, will fu read, palliative discussions pending 
77F h/o HTN, HLD, DM on metformin, hypothyroidism, depression, and sciatica presents with b/l lower extremity weakness with MRI findings of severe spinal stenosis at L3-4, L4-5, degenerative changes and stenosis adjacent to prior ACDF at C2-T2. Medicine consulted for medical optimization prior to tentative procedure with neurosurgery.   
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 CT CAP and repeat CT C/T/L done, will fu read, palliative discussions pending   1/4-5: pending callback from onc at [place] Dr. Andrews , UA +, urine Cx sent, tentative OR Friday 1/6: Patient and family declining surgery, plan for discharge to rehab. Will obtain bilateral LE dopplers
77F h/o HTN, HLD, DM on metformin, hypothyroidism, depression, and sciatica presents with b/l lower extremity weakness with MRI findings of severe spinal stenosis at L3-4, L4-5, degenerative changes and stenosis adjacent to prior ACDF at C2-T2. Medicine consulted for medical optimization prior to tentative procedure with neurosurgery.   
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 CT CAP and repeat CT C/T/L done, will fu read, palliative discussions pending   1/4-5: pending callback from onc at [place] Dr. Andrews , UA +, urine Cx sent, tentative OR Friday
77F h/o HTN, HLD, DM on metformin, hypothyroidism, depression, and sciatica presents with b/l lower extremity weakness with MRI findings of severe spinal stenosis at L3-4, L4-5, degenerative changes and stenosis adjacent to prior ACDF at C2-T2. Medicine consulted for medical optimization prior to tentative procedure with neurosurgery.   

## 2023-01-09 NOTE — DIETITIAN INITIAL EVALUATION ADULT - NSICDXPASTMEDICALHX_GEN_ALL_CORE_FT
PAST MEDICAL HISTORY:  Colon cancer     DM (diabetes mellitus)     HLD (hyperlipidemia)     HTN (hypertension)     Hypothyroidism     Major depression     Sciatica

## 2023-01-09 NOTE — PROGRESS NOTE ADULT - SUBJECTIVE AND OBJECTIVE BOX
76 yo F PMH HTN, HLD, DM on metformin, hypothyroidism, depression, and sciatica presents with b/l lower extremity weakness. Pt reports she had a fall about 2 weeks ago and weakness began suddenly one week ago. She has been unable to get out of bed and walk at home. Pt states she has associated numbness, tingling, and pain 7/10 constant in her posterior thighs and lower legs. Pt took acetaminophen at home for pain which helped. Pt also endorses urinary incontinence and stool incontinence and chronic dysuria. Pt denies fever, chills, headache, CP, SOB, N/V/D, or constipation.  Does have lower back pain, non radicular  Patient also report to have ovarian cancer and had oopherectomy 2 years ago, unclear if chemo needed.   PMD is out of [place] Dr. Jorge L Hung from [place]     PAST MEDICAL & SURGICAL HISTORY:  HTN (hypertension)  HLD (hyperlipidemia)  Hypothyroidism  Major depression  DM (diabetes mellitus)  Sciatica  No significant past surgical history    No issues overnight    ICU Vital Signs Last 24 Hrs  T(C): 37.2 (08 Jan 2023 21:56), Max: 37.2 (08 Jan 2023 21:56)  T(F): 99 (08 Jan 2023 21:56), Max: 99 (08 Jan 2023 21:56)  HR: 67 (08 Jan 2023 21:56) (67 - 96)  BP: 104/49 (08 Jan 2023 21:56) (103/65 - 132/50)  BP(mean): --  ABP: --  ABP(mean): --  RR: 18 (08 Jan 2023 21:56) (16 - 18)  SpO2: 100% (08 Jan 2023 21:56) (96% - 100%)    O2 Parameters below as of 08 Jan 2023 21:56  Patient On (Oxygen Delivery Method): room air    AAO X 3  PERRLA, EOMI  Face symmetric   Motor: Bilateral UE 5/5  RLE: HF 2/5, KE 3/5, DF/PF 0/5  LLE: HF 3/5, KE 3/5, DF/PF 1/5  SILT    MEDICATIONS  (STANDING):  baclofen 10 milliGRAM(s) Oral every 8 hours  dextrose 5%. 1000 milliLiter(s) (50 mL/Hr) IV Continuous <Continuous>  dextrose 5%. 1000 milliLiter(s) (100 mL/Hr) IV Continuous <Continuous>  dextrose 50% Injectable 25 Gram(s) IV Push once  dextrose 50% Injectable 12.5 Gram(s) IV Push once  dextrose 50% Injectable 25 Gram(s) IV Push once  enoxaparin Injectable 40 milliGRAM(s) SubCutaneous <User Schedule>  FLUoxetine 20 milliGRAM(s) Oral daily  gabapentin 400 milliGRAM(s) Oral three times a day  glucagon  Injectable 1 milliGRAM(s) IntraMuscular once  insulin lispro (ADMELOG) corrective regimen sliding scale   SubCutaneous three times a day before meals  insulin lispro (ADMELOG) corrective regimen sliding scale   SubCutaneous at bedtime  lactulose Syrup 10 Gram(s) Oral two times a day  levothyroxine 75 MICROGram(s) Oral daily  losartan 100 milliGRAM(s) Oral daily  nitrofurantoin monohydrate/macrocrystals (MACROBID) 100 milliGRAM(s) Oral two times a day  pantoprazole    Tablet 40 milliGRAM(s) Oral before breakfast  polyethylene glycol 3350 17 Gram(s) Oral two times a day  saline laxative (FLEET) Rectal Enema 1 Enema Rectal daily  senna 2 Tablet(s) Oral at bedtime  simvastatin 20 milliGRAM(s) Oral at bedtime    MEDICATIONS  (PRN):  acetaminophen     Tablet .. 650 milliGRAM(s) Oral every 6 hours PRN Temp greater or equal to 38C (100.4F), Moderate Pain (4 - 6)  benzonatate 100 milliGRAM(s) Oral three times a day PRN Cough  bisacodyl Suppository 10 milliGRAM(s) Rectal daily PRN Constipation  dextrose Oral Gel 15 Gram(s) Oral once PRN Blood Glucose LESS THAN 70 milliGRAM(s)/deciliter  melatonin 6 milliGRAM(s) Oral at bedtime PRN Sleep  oxyCODONE    IR 5 milliGRAM(s) Oral every 4 hours PRN Moderate Pain (4 - 6)  SUMAtriptan 50 milliGRAM(s) Oral daily PRN Migraine

## 2023-01-09 NOTE — DIETITIAN INITIAL EVALUATION ADULT - PERTINENT MEDS FT
MEDICATIONS  (STANDING):  baclofen 10 milliGRAM(s) Oral every 8 hours  cefTRIAXone   IVPB 1000 milliGRAM(s) IV Intermittent every 24 hours  dextrose 5%. 1000 milliLiter(s) (50 mL/Hr) IV Continuous <Continuous>  dextrose 5%. 1000 milliLiter(s) (100 mL/Hr) IV Continuous <Continuous>  dextrose 50% Injectable 25 Gram(s) IV Push once  dextrose 50% Injectable 12.5 Gram(s) IV Push once  dextrose 50% Injectable 25 Gram(s) IV Push once  enoxaparin Injectable 40 milliGRAM(s) SubCutaneous <User Schedule>  FLUoxetine 20 milliGRAM(s) Oral daily  gabapentin 400 milliGRAM(s) Oral three times a day  glucagon  Injectable 1 milliGRAM(s) IntraMuscular once  insulin lispro (ADMELOG) corrective regimen sliding scale   SubCutaneous three times a day before meals  insulin lispro (ADMELOG) corrective regimen sliding scale   SubCutaneous at bedtime  lactulose Syrup 10 Gram(s) Oral two times a day  levothyroxine 75 MICROGram(s) Oral daily  losartan 100 milliGRAM(s) Oral daily  pantoprazole    Tablet 40 milliGRAM(s) Oral before breakfast  polyethylene glycol 3350 17 Gram(s) Oral two times a day  saline laxative (FLEET) Rectal Enema 1 Enema Rectal daily  senna 2 Tablet(s) Oral at bedtime  simvastatin 20 milliGRAM(s) Oral at bedtime    MEDICATIONS  (PRN):  acetaminophen     Tablet .. 650 milliGRAM(s) Oral every 6 hours PRN Temp greater or equal to 38C (100.4F), Moderate Pain (4 - 6)  benzonatate 100 milliGRAM(s) Oral three times a day PRN Cough  bisacodyl Suppository 10 milliGRAM(s) Rectal daily PRN Constipation  dextrose Oral Gel 15 Gram(s) Oral once PRN Blood Glucose LESS THAN 70 milliGRAM(s)/deciliter  melatonin 6 milliGRAM(s) Oral at bedtime PRN Sleep  oxyCODONE    IR 5 milliGRAM(s) Oral every 4 hours PRN Moderate Pain (4 - 6)  SUMAtriptan 50 milliGRAM(s) Oral daily PRN Migraine

## 2023-01-09 NOTE — DISCHARGE NOTE PROVIDER - HOSPITAL COURSE
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 CT CAP and repeat CT C/T/L done, will fu read, palliative discussions pending   1/4-5: pending callback from onc at [place] Dr. Andrews , UA +, urine Cx sent, tentative OR Friday 1/6: Patient and family declining surgery, plan for discharge to rehab. Will obtain bilateral LE dopplers  1/7-9: Stable exam. Dopplers negative. Await rehab placement

## 2023-01-09 NOTE — DISCHARGE NOTE NURSING/CASE MANAGEMENT/SOCIAL WORK - PATIENT PORTAL LINK FT
You can access the FollowMyHealth Patient Portal offered by [facility] by registering at the following website: [email or web address]. By joining [employer]’s FollowMyHealth portal, you will also be able to view your health information using other applications (apps) compatible with our system.

## 2023-01-09 NOTE — DIETITIAN INITIAL EVALUATION ADULT - OTHER INFO
Nutrition assessment for length of stay.     77F h/o HTN, HLD, DM on metformin (noted to be well controlled a1c 6.6%), hypothyroidism, depression, and sciatica presents with b/l lower extremity weakness with MRI findings of severe spinal stenosis at L3-4, L4-5, degenerative changes and stenosis adjacent to prior ACDF at C2-T2. Medicine consulted for medical optimization prior to tentative procedure with neurosurgery. Patient with acute UTI, c/o persistent dysuria, receiving ABT; also previously noted with hyponatremia.    Patient endorses a fair appetite, reported to be completing ~50% of her meals. No chewing or swallowing difficulties reported. No GI distress reported i.e. nausea, vomiting, diarrhea. Last BM 1/7 per flowsheets. Patient reported unspecified weight gain PTA, estimates usual body weight between 170-180 pounds.  Current weight 1/8 - 73.9kg (162.9 pounds); Wts over the past 4 days: 1/7 - 75.6kg (166.3 pounds), 1/6 - 75.1kg (165.2 pounds), 1/5 - 76kg (167.2kg); Adm/dosing 77kg (169.4 pounds, 12/30/22).  Weights trending down possibly in part related to UTI, improved edema. Patient previously noted with 1+ edema to lt and rt feet and now with no edema noted. Patient currently on DASH/TLC (cholesterol and sodium restricted); reviewed therapeutic diet with patient, but patient having difficulty keeping eyes open.  RD also reviewed plate method for DM; written materials given as patient with difficulty keeping eyes open for diet instruction.

## 2023-01-09 NOTE — DISCHARGE NOTE NURSING/CASE MANAGEMENT/SOCIAL WORK - NSDCPEFALRISK_GEN_ALL_CORE
For information on Fall & Injury Prevention, visit: [email or web address] OR  [email or web address] OR  https://www.cdc.gov/steadi/patient.html

## 2023-01-09 NOTE — PROGRESS NOTE ADULT - PROBLEM SELECTOR PROBLEM 1
Lumbar spinal stenosis
Spinal stenosis
Encounter for preoperative assessment
Lumbar spinal stenosis
Encounter for preoperative assessment
Lumbar spinal stenosis
Encounter for preoperative assessment

## 2023-01-09 NOTE — DISCHARGE NOTE PROVIDER - NSDCMRMEDTOKEN_GEN_ALL_CORE_FT
acetaminophen 325 mg oral tablet: 2 tab(s) orally every 6 hours, As needed, Temp greater or equal to 38C (100.4F), Moderate Pain (4 - 6)  aspirin 81 mg oral delayed release tablet: 1 tab(s) orally once a day  baclofen 10 mg oral tablet: 1 tab(s) orally 3 times a day  benzonatate 100 mg oral capsule: 1 cap(s) orally 3 times a day, As needed, Cough  bisacodyl 10 mg rectal suppository: 1 suppository(ies) rectal once a day, As needed, Constipation  Calcium 600+D oral tablet: 1 tab(s) orally 2 times a day  cefpodoxime 100 mg oral tablet: 1 tab(s) orally 2 times a day   enoxaparin: 40 milligram(s) subcutaneous once a day  gabapentin 400 mg oral capsule: 1 cap(s) orally 3 times a day  levothyroxine 75 mcg (0.075 mg) oral tablet: 1 tab(s) orally once a day  losartan 100 mg oral tablet: 1 tab(s) orally once a day  losartan-hydroCHLOROthiazide 100 mg-12.5 mg oral tablet: 1 tab(s) orally once a day  melatonin 3 mg oral tablet: 2 tab(s) orally once a day (at bedtime), As needed, Sleep  metFORMIN 500 mg oral tablet: 1 tab(s) orally 2 times a day  oxyCODONE 5 mg oral tablet: 1 tab(s) orally every 4 hours, As needed, Moderate Pain (4 - 6)  pantoprazole 40 mg oral delayed release tablet: 1 tab(s) orally once a day (before a meal)  polyethylene glycol 3350 oral powder for reconstitution: 17 gram(s) orally 2 times a day  PROzac 20 mg oral capsule: 1 cap(s) orally once a day  senna leaf extract oral tablet: 2 tab(s) orally once a day (at bedtime)  simvastatin 20 mg oral tablet: 1 tab(s) orally once a day (at bedtime)  SUMAtriptan 25 mg oral tablet: 1 tab(s) orally once, As Needed   acetaminophen 325 mg oral tablet: 2 tab(s) orally every 6 hours, As needed, Temp greater or equal to 38C (100.4F), Moderate Pain (4 - 6)  aspirin 81 mg oral delayed release tablet: 1 tab(s) orally once a day  baclofen 10 mg oral tablet: 1 tab(s) orally 3 times a day  benzonatate 100 mg oral capsule: 1 cap(s) orally 3 times a day, As needed, Cough  bisacodyl 10 mg rectal suppository: 1 suppository(ies) rectal once a day, As needed, Constipation  Calcium 600+D oral tablet: 1 tab(s) orally 2 times a day  cefpodoxime 100 mg oral tablet: 1 tab(s) orally 2 times a day   enoxaparin: 40 milligram(s) subcutaneous once a day  gabapentin 400 mg oral capsule: 1 cap(s) orally 3 times a day  levothyroxine 75 mcg (0.075 mg) oral tablet: 1 tab(s) orally once a day  losartan-hydroCHLOROthiazide 100 mg-12.5 mg oral tablet: 1 tab(s) orally once a day  melatonin 3 mg oral tablet: 2 tab(s) orally once a day (at bedtime), As needed, Sleep  metFORMIN 500 mg oral tablet: 1 tab(s) orally 2 times a day  oxyCODONE 5 mg oral tablet: 1 tab(s) orally every 4 hours, As needed, Moderate Pain (4 - 6)  pantoprazole 40 mg oral delayed release tablet: 1 tab(s) orally once a day (before a meal)  polyethylene glycol 3350 oral powder for reconstitution: 17 gram(s) orally 2 times a day  PROzac 20 mg oral capsule: 1 cap(s) orally once a day  senna leaf extract oral tablet: 2 tab(s) orally once a day (at bedtime)  simvastatin 20 mg oral tablet: 1 tab(s) orally once a day (at bedtime)  SUMAtriptan 25 mg oral tablet: 1 tab(s) orally once, As Needed

## 2024-02-28 NOTE — PATIENT PROFILE ADULT - FALL HARM RISK - PATIENT NEEDS ASSISTANCE
Addended by: ROLANDO GOULD on: 2/28/2024 10:42 AM     Modules accepted: Level of Service    
Standing/Walking/Toileting/Moving from bed to chair